# Patient Record
Sex: FEMALE | Race: WHITE | NOT HISPANIC OR LATINO
[De-identification: names, ages, dates, MRNs, and addresses within clinical notes are randomized per-mention and may not be internally consistent; named-entity substitution may affect disease eponyms.]

---

## 2022-08-29 ENCOUNTER — APPOINTMENT (OUTPATIENT)
Dept: PSYCHIATRY | Facility: CLINIC | Age: 27
End: 2022-08-29

## 2022-08-29 DIAGNOSIS — Z85.71 PERSONAL HISTORY OF HODGKIN LYMPHOMA: ICD-10-CM

## 2022-08-29 DIAGNOSIS — Z87.09 PERSONAL HISTORY OF OTHER DISEASES OF THE RESPIRATORY SYSTEM: ICD-10-CM

## 2022-08-29 DIAGNOSIS — Z87.2 PERSONAL HISTORY OF DISEASES OF THE SKIN AND SUBCUTANEOUS TISSUE: ICD-10-CM

## 2022-08-29 PROBLEM — Z00.00 ENCOUNTER FOR PREVENTIVE HEALTH EXAMINATION: Status: ACTIVE | Noted: 2022-08-29

## 2022-08-29 PROCEDURE — 99205 OFFICE O/P NEW HI 60 MIN: CPT | Mod: 95

## 2022-09-15 ENCOUNTER — APPOINTMENT (OUTPATIENT)
Dept: PSYCHIATRY | Facility: CLINIC | Age: 27
End: 2022-09-15

## 2022-09-16 ENCOUNTER — APPOINTMENT (OUTPATIENT)
Dept: PSYCHIATRY | Facility: CLINIC | Age: 27
End: 2022-09-16

## 2022-09-16 DIAGNOSIS — F41.9 ANXIETY DISORDER, UNSPECIFIED: ICD-10-CM

## 2022-09-16 PROCEDURE — 99215 OFFICE O/P EST HI 40 MIN: CPT | Mod: 95

## 2022-09-16 RX ORDER — QUETIAPINE 400 MG/1
400 TABLET, FILM COATED, EXTENDED RELEASE ORAL
Qty: 60 | Refills: 0 | Status: ACTIVE | COMMUNITY
Start: 1900-01-01 | End: 1900-01-01

## 2022-09-19 ENCOUNTER — NON-APPOINTMENT (OUTPATIENT)
Age: 27
End: 2022-09-19

## 2022-09-22 ENCOUNTER — APPOINTMENT (OUTPATIENT)
Dept: PSYCHIATRY | Facility: CLINIC | Age: 27
End: 2022-09-22

## 2022-09-29 ENCOUNTER — NON-APPOINTMENT (OUTPATIENT)
Age: 27
End: 2022-09-29

## 2022-09-29 ENCOUNTER — APPOINTMENT (OUTPATIENT)
Dept: PSYCHIATRY | Facility: CLINIC | Age: 27
End: 2022-09-29

## 2022-09-29 ENCOUNTER — TRANSCRIPTION ENCOUNTER (OUTPATIENT)
Age: 27
End: 2022-09-29

## 2022-09-29 VITALS
SYSTOLIC BLOOD PRESSURE: 119 MMHG | HEART RATE: 134 BPM | RESPIRATION RATE: 16 BRPM | BODY MASS INDEX: 24.2 KG/M2 | WEIGHT: 140 LBS | HEIGHT: 63.75 IN | OXYGEN SATURATION: 97 % | DIASTOLIC BLOOD PRESSURE: 82 MMHG | TEMPERATURE: 97.7 F

## 2022-09-30 ENCOUNTER — APPOINTMENT (OUTPATIENT)
Dept: PSYCHIATRY | Facility: CLINIC | Age: 27
End: 2022-09-30

## 2022-09-30 PROCEDURE — 99214 OFFICE O/P EST MOD 30 MIN: CPT | Mod: 95

## 2022-09-30 RX ORDER — BUSPIRONE HYDROCHLORIDE 5 MG/1
5 TABLET ORAL EVERY 8 HOURS
Qty: 90 | Refills: 0 | Status: ACTIVE | COMMUNITY
Start: 1900-01-01 | End: 1900-01-01

## 2022-10-06 ENCOUNTER — NON-APPOINTMENT (OUTPATIENT)
Age: 27
End: 2022-10-06

## 2022-10-13 ENCOUNTER — APPOINTMENT (OUTPATIENT)
Dept: PSYCHIATRY | Facility: CLINIC | Age: 27
End: 2022-10-13

## 2022-10-13 ENCOUNTER — NON-APPOINTMENT (OUTPATIENT)
Age: 27
End: 2022-10-13

## 2022-10-13 PROCEDURE — 99214 OFFICE O/P EST MOD 30 MIN: CPT | Mod: 95

## 2022-10-13 NOTE — DISCUSSION/SUMMARY
[FreeTextEntry1] : Isabella Hensley 26yo white cis heterosexual Buddhist female,  and recently moved to [place] with her . She has a history of depression, social anxiety, disordered eating, dissociative symptoms, and psychotic experiences. She has one previous inpatient hospitalization in [place] from June-July 2021 precipitated by CAH becoming more intense and telling her to kill herself. She describes the voices as “other people’s thoughts about her”. In the past year she has been in the Step program (FEP) at [employer]. She is currently stable on Seroquel ER 400mg PO BID, Lamictal 200mg PO Qam, buspirone 5mg PO TID PRN anxiety. Pt notes a history of childhood social and generalized anxiety and disordered eating in adolescence – excessive exercise and food restriction. During her senior year of HS she experienced a MDE marked by depressed mood, excessive sleep, “dark thoughts”, difficulty with concentration resulting in academic decline, and anhedonia. She first sought treatment her Freshman year of College in the Fall of 2015 at [facility] where she was prescribed sertraline from 4282-0406. She discontinued in 2017. In 2015 she first started hearing transient voices and dissociative phenomenon but was able to reality test. In 2017 she dropped out of mental health treatment while undergoing chemotherapy for Hodgkin’s Lymphoma. She resumed tx in Spring 2019 at which time she experienced more “out of body” dissociative symptoms. She was in treatment with a therapist, Dr. Youssef then started a DBT IOP in summer of 2019 where she was seen by a psych NP for 3 months and started on a low dose of abilify. In Fall of 2019 she started treatment with psychiatrist, Dr. Crowell and therapist Adolfo Randolph (Fall 2019-Sring 2021). Dr. Crowell prescribed abilify, busbar, gabapentin until Summer of 2020. She was not on meds btw summer 2020 and 2021 at which time she was hospitalized in [place] and started on Seroquel after trial s of Haldol and high dose abilify. She was discharged on Seroquel IR 400mg which was changed to 400mg ER BID in Feb 2022. In Fall of 2021 lamictal 200mg PO Qam was added due to rapid cycling mood. She is also prescribed propranolol 5mg PO TID PRN anxiety. Patient started to meet threshold for psychosis in Summer 2020 around the time she graduated from college. 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 She also started experiencing thought broadcasting, thought control, ideas of reference. She reports some medication side effects including: Dizziness, constipation, weight gain, tachycarida. Patient denies recent SI but reports SI in the past with some preparatory behavior – see above for details. History of self injurious behavior in 2015 including hitting head with phone, scratching self and stabbing self with pen. Trauma: no trauma hx. Substance: infrequent social drinking. Fam Hx: Father – anxiety, paranoia; brother- depression, ADHD, Sister: panic attacks. Social Hx: Grew up in [facility] [place] [facility] [place], twin sister, 23yo brther, 16yo sister. Father is a  at [employer] and mother was teacher and stay at home parent. Patient studied psychology at [employer] and is currently working toward [employer] certification. Medical Hx: Hodgin’s Lymphoma summer 2017, in remission; asthma, eczema. Allergies: NKDA. \par \par 30 minutes spent: reviewed chart,  developed rapport, provided psycheducation, reviewed meds - risks vs benefits and potential side effects. Reviewed labs. Discussed lifestyle modifications to address elevated lipid profile. F/U w/ PCP. Shared decision to continue adjusting dose of seroquel to eventually be taken at bedtime.\par \par

## 2022-10-13 NOTE — PAST MEDICAL HISTORY
[FreeTextEntry1] : PAST OUTPATIENT TREATMENT (PSYCHOTHERAPY, PSYCHOPHARMACOLOGY, PSYCHOLOGICAL TESTING):\par [facility] 5428-1114 (freshman year) for therapy and medication management; DBT IOP Summer 2019; DBT outpatient therapist Adolfo Randolph fall 2019- spring 2021; psychiatrist Dr. Crowell for medication mgmt fall 2019\par PAST PSYCHIATRIC MEDICATIONS (NAMES, DOSES, DATES TAKEN, EFFECTIVENESS):\par Sertraline from 3945-3935\par Abilify, Busbar, Gabapentin until Summer of 2020\par Seroquel ER 400mg PO BID, Lamictal 200mg PO Qam, Propranolol 5mg PO TID PRN anxiety (current medications)\par PAST HOSPITALIZATIONS (DATES, REASON FOR ADMISSION, LENGTH OF STAY, TREATMENT, LAST PSYCHIATRIC TREATMENT):\par [facility] voluntary admission due to paranoid ideation, CAH to harm self June-July 2021 for 2.5 weeks\par DATE OF LAST PHYSICAL EXAMINATION:\par Unknown\par

## 2022-10-13 NOTE — PHYSICAL EXAM
[Average] : average [Cooperative] : cooperative [Euthymic] : euthymic [Clear] : clear [WNL] : within normal limits [de-identified] : anxious

## 2022-10-13 NOTE — HISTORY OF PRESENT ILLNESS
[FreeTextEntry1] : Pt is seen and evaluated. Interval history reviewed. Reviewed labs, VS, meds, last visit with former psychiatrist. Hgb A1c was 4.7. Pt met with RN for core visit and routine monitoring. Pt wishes to continue adjusting dose of seroquel to take more at bedtime. She is now taking 300mg PO Qam and 500mg PO QHS due to daytime sedation. LDL and T chol was elevated at last visit with psychiatrist.PT scheduled to meet with new PCP on 11/7 - Dr. Emily Leal - [phone]. Pt denies any change from baseline anxiety, mood. She denies SI/HI or AH.

## 2022-10-13 NOTE — RISK ASSESSMENT
[No, patient denies ideation or behavior] : No, patient denies ideation or behavior [No] : No [FreeTextEntry9] : Low acute risk. Elevated chronic risk given diagnosis, past suicidality with preparatory behavior. Protective factors include supportive family, future orientation, level of education.

## 2022-10-13 NOTE — SOCIAL HISTORY
[FreeTextEntry1] : RACE/ETHNICITY:   \par White\par GENDER IDENTITY:     \par Female\par SEXUAL IDENTITY:\par Heterosexual\par MARITAL STATUS:  \par \par PREFERRED LANGUAGE:    \par English\par OTHER LANGUAGES SPOKEN:\par Turkish\par Religion AFFILIATION:\par [employer]\par PLACE OF BIRTH:\par [place], [place]\par DEVELOPMENTAL HISTORY (DELAYED MILESTONES: SPEECH, MOTOR, FINE MOTOR, SOCIAL; HISTORY OF IN UTERO EXPOSURE, BIRTH HISTORY, SIBLING RIVALRY)\par Unremarkable developmental history noted/reported\par SCHOOL TYPE/GRADE:\par [[X ]] PUBLIC\par [[ ]] PRIVATE\par [[ ]] CHARTER\par [[ ]] OTHER:  [ ]\par GRADE:  completed bachelor degree at [employer] \par PROBLEMS IN SCHOOL (LEARNING AND BEHAVIORAL PROBLEMS, HISTORY OF IEP/504):\par None reported/noted\par SIGNIFICANT MEMBERS OF HOUSEHOLD (CHILDREN, PARENTS, SIBLINGS):\par Grew up with parents and younger brother (23yo) and twin sister; currently living with \par PAST/CURRENT RELATIONSHIP WITH FAMILY: \par Reports being "close" with family/parents\par PAST/CURRENT RELATIONSHIPS WITH SIGNIFICANT OTHERS: \par "Good" relationship w//others\par SIGNIFICANT LOSSES (DIVORCE, NEGLECT, ETC.):\par None reported\par WORK HISTORY (PAST AND CURRENT EMPLOYMENT):\par 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

## 2022-10-13 NOTE — REASON FOR VISIT
[FreeTextEntry1] : Psychiatric Follow Up [Home] : at home, [unfilled] , at the time of the visit. [Other Location: e.g. Home (Enter Location, City,State)___] : at [unfilled] [Spouse] : spouse [Mother] : mother [Patient] : the patient

## 2022-10-13 NOTE — PLAN
[Yes. details: ___] : Yes, [unfilled] [Medication education provided] : Medication education provided. [Rationale for medication choices, possible risks/precautions, benefits, alternative treatment choices, and consequences of non-treatment discussed] : Rationale for medication choices, possible risks/precautions, benefits, alternative treatment choices, and consequences of non-treatment discussed with patient/family/caregiver  [Order(s) for labs placed in [place] EHR] : Labs [FreeTextEntry5] : PLAN: #) decrease seroquel ER 300mg PO Qam and 500mg PO QHS to 250mg PO Qam and 550mg PO QHS for 3 days then to 200mg PO Qam and 600mg PO QHS #) continue lamictal 200mg PO QD #) continue buspirone 5mg Po TID #) f/u with PCP re: tachycardia #) RN visits Q 3 months for VS, AIMS etc \par

## 2022-10-20 ENCOUNTER — NON-APPOINTMENT (OUTPATIENT)
Age: 27
End: 2022-10-20

## 2022-10-27 ENCOUNTER — NON-APPOINTMENT (OUTPATIENT)
Age: 27
End: 2022-10-27

## 2022-10-27 ENCOUNTER — APPOINTMENT (OUTPATIENT)
Dept: PSYCHIATRY | Facility: CLINIC | Age: 27
End: 2022-10-27

## 2022-10-27 PROCEDURE — 99214 OFFICE O/P EST MOD 30 MIN: CPT | Mod: 95

## 2022-10-27 NOTE — DISCUSSION/SUMMARY
[FreeTextEntry1] : Isabella Hensley 28yo white cis heterosexual Congregational female,  and recently moved to [place] with her . She has a history of depression, social anxiety, disordered eating, dissociative symptoms, and psychotic experiences. She has one previous inpatient hospitalization in [place] from June-July 2021 precipitated by CAH becoming more intense and telling her to kill herself. She describes the voices as “other people’s thoughts about her”. In the past year she has been in the Step program (FEP) at [employer]. She is currently stable on Seroquel ER 400mg PO BID, Lamictal 200mg PO Qam, buspirone 5mg PO TID PRN anxiety. Pt notes a history of childhood social and generalized anxiety and disordered eating in adolescence – excessive exercise and food restriction. During her senior year of HS she experienced a MDE marked by depressed mood, excessive sleep, “dark thoughts”, difficulty with concentration resulting in academic decline, and anhedonia. She first sought treatment her Freshman year of College in the Fall of 2015 at [facility] where she was prescribed sertraline from 2936-0861. She discontinued in 2017. In 2015 she first started hearing transient voices and dissociative phenomenon but was able to reality test. In 2017 she dropped out of mental health treatment while undergoing chemotherapy for Hodgkin’s Lymphoma. She resumed tx in Spring 2019 at which time she experienced more “out of body” dissociative symptoms. She was in treatment with a therapist, Dr. Youssef then started a DBT IOP in summer of 2019 where she was seen by a psych NP for 3 months and started on a low dose of abilify. In Fall of 2019 she started treatment with psychiatrist, Dr. Crowell and therapist Adolfo Randolph (Fall 2019-Sring 2021). Dr. Crowell prescribed abilify, busbar, gabapentin until Summer of 2020. She was not on meds btw summer 2020 and 2021 at which time she was hospitalized in [place] and started on Seroquel after trial s of Haldol and high dose abilify. She was discharged on Seroquel IR 400mg which was changed to 400mg ER BID in Feb 2022. In Fall of 2021 lamictal 200mg PO Qam was added due to rapid cycling mood. She is also prescribed propranolol 5mg PO TID PRN anxiety. Patient started to meet threshold for psychosis in Summer 2020 around the time she graduated from college. 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 She also started experiencing thought broadcasting, thought control, ideas of reference. She reports some medication side effects including: Dizziness, constipation, weight gain, tachycarida. Patient denies recent SI but reports SI in the past with some preparatory behavior – see above for details. History of self injurious behavior in 2015 including hitting head with phone, scratching self and stabbing self with pen. Trauma: no trauma hx. Substance: infrequent social drinking. Fam Hx: Father – anxiety, paranoia; brother- depression, ADHD, Sister: panic attacks. Social Hx: Grew up in [place] [facility], twin sister, 25yo brther, 18yo sister. Father is a  at [employer] and mother was teacher and stay at home parent. Patient studied psychology at [employer] and is currently working toward [employer] certification. Medical Hx: Hodgin’s Lymphoma summer 2017, in remission; asthma, eczema. Allergies: NKDA. \par \par 30 minutes spent: reviewed chart,  developed rapport, provided psycheducation, reviewed meds - risks vs benefits and potential side effects. Reviewed labs. Discussed lifestyle modifications to address elevated lipid profile. F/U w/ PCP. Shared decision to continue adjusting dose of seroquel to eventually be taken at bedtime.\par \par

## 2022-10-27 NOTE — PHYSICAL EXAM
[Average] : average [Cooperative] : cooperative [Euthymic] : euthymic [Clear] : clear [WNL] : within normal limits [de-identified] : anxious

## 2022-10-27 NOTE — SOCIAL HISTORY
[FreeTextEntry1] : RACE/ETHNICITY:   \par White\par GENDER IDENTITY:     \par Female\par SEXUAL IDENTITY:\par Heterosexual\par MARITAL STATUS:  \par \par PREFERRED LANGUAGE:    \par English\par OTHER LANGUAGES SPOKEN:\par Amharic\par Hinduism AFFILIATION:\par [employer]oodoo\par PLACE OF BIRTH:\par [place], [place]\par DEVELOPMENTAL HISTORY (DELAYED MILESTONES: SPEECH, MOTOR, FINE MOTOR, SOCIAL; HISTORY OF IN UTERO EXPOSURE, BIRTH HISTORY, SIBLING RIVALRY)\par Unremarkable developmental history noted/reported\par SCHOOL TYPE/GRADE:\par [[X ]] PUBLIC\par [[ ]] PRIVATE\par [[ ]] CHARTER\par [[ ]] OTHER:  [ ]\par GRADE:  completed bachelor degree at [employer] \par PROBLEMS IN SCHOOL (LEARNING AND BEHAVIORAL PROBLEMS, HISTORY OF IEP/504):\par None reported/noted\par SIGNIFICANT MEMBERS OF HOUSEHOLD (CHILDREN, PARENTS, SIBLINGS):\par Grew up with parents and younger brother (25yo) and twin sister; currently living with \par PAST/CURRENT RELATIONSHIP WITH FAMILY: \par Reports being "close" with family/parents\par PAST/CURRENT RELATIONSHIPS WITH SIGNIFICANT OTHERS: \par "Good" relationship w//others\par SIGNIFICANT LOSSES (DIVORCE, NEGLECT, ETC.):\par None reported\par WORK HISTORY (PAST AND CURRENT EMPLOYMENT):\par 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

## 2022-10-27 NOTE — PAST MEDICAL HISTORY
[FreeTextEntry1] : PAST OUTPATIENT TREATMENT (PSYCHOTHERAPY, PSYCHOPHARMACOLOGY, PSYCHOLOGICAL TESTING):\par [facility] [id number] (freshman year) for therapy and medication management; DBT IOP Summer 2019; DBT outpatient therapist Adolfo Randolph fall 2019- spring 2021; psychiatrist Dr. Crowell for medication mgmt fall 2019\par PAST PSYCHIATRIC MEDICATIONS (NAMES, DOSES, DATES TAKEN, EFFECTIVENESS):\par Sertraline from 6536-9199\par Abilify, Busbar, Gabapentin until Summer of 2020\par Seroquel ER 400mg PO BID, Lamictal 200mg PO Qam, Propranolol 5mg PO TID PRN anxiety (current medications)\par PAST HOSPITALIZATIONS (DATES, REASON FOR ADMISSION, LENGTH OF STAY, TREATMENT, LAST PSYCHIATRIC TREATMENT):\par [facility] voluntary admission due to paranoid ideation, CAH to harm self June-July 2021 for 2.5 weeks\par DATE OF LAST PHYSICAL EXAMINATION:\par Unknown\par

## 2022-10-27 NOTE — HISTORY OF PRESENT ILLNESS
[FreeTextEntry1] : Pt is seen and evaluated. Interval history reviewed. Pt reports good tolerance and response since last adjustment to seroquel dosing - Pt wishes to continue adjusting dose of seroquel to take more at bedtime. She is now taking 200mg PO Qam and 600mg PO QHS due to daytime sedation. LDL and T chol was elevated at last visit with psychiatrist. PT scheduled to meet with new PCP on 11/7 - Dr. Emily Leal - [phone]. Pt denies any change from baseline anxiety, mood. She denies SI/HI or AH.

## 2022-10-27 NOTE — FAMILY HISTORY
[FreeTextEntry1] : Twin sister with hx of panic attacks\par Younger brother with hx of ADHD and depression\par Father w/anxiety\par Maternal first cousin w/schizoaffective d/o

## 2022-10-27 NOTE — PLAN
[Yes. details: ___] : Yes, [unfilled] [Medication education provided] : Medication education provided. [Rationale for medication choices, possible risks/precautions, benefits, alternative treatment choices, and consequences of non-treatment discussed] : Rationale for medication choices, possible risks/precautions, benefits, alternative treatment choices, and consequences of non-treatment discussed with patient/family/caregiver  [Order(s) for labs placed in [place] EHR] : Labs [FreeTextEntry5] : PLAN: #) change seroquel ER 200mg PO Qam and 600mg PO QHS to 100mg PO Qam and 700mg PO QHS #) continue lamictal 200mg PO QD #) continue buspirone 5mg Po TID #) f/u with PCP re: tachycardia -visit scheduled on 11/17 #) RN visits Q 3 months for VS, AIMS etc \par

## 2022-10-27 NOTE — PHYSICAL EXAM
[FreeTextEntry1] : Pt arrived on time to community visit, euthymic mood and congruent affect, thought process linear and content appropriate to discussion.

## 2022-10-27 NOTE — REASON FOR VISIT
[FreeTextEntry1] : Psychiatric Follow Up [Home] : at home, [unfilled] , at the time of the visit. [Other Location: e.g. Home (Enter Location, City,State)___] : at [unfilled] [Patient] : the patient

## 2022-11-03 ENCOUNTER — NON-APPOINTMENT (OUTPATIENT)
Age: 27
End: 2022-11-03

## 2022-11-07 NOTE — PLAN
[Return in ____ week(s)] : Return in [unfilled] week(s) [FreeTextEntry2] : Begin to "feel comfortable discussing issues in [her] life" with therapist. \par Decrease anxiety regarding social situations and interactions with others.  [de-identified] : Discussed structured therapy as both helpful as well as not helpful to addressing anxiety. Discussed the need to move slowly with a new therapist in terms of discussing different issues due to the importance of establishing trust. Discussed related issues regarding feeling in control and how different feelings about control can affect thoughts and behaviors.

## 2022-11-07 NOTE — BEHAVIORAL HEALTH
[Prevent psychological harm to patient or another individual] : Prevent psychological harm to patient or another individual [FreeTextEntry1] : Pt is in the process of connecting with a new therapist and has demonstrated significant anxiety in beginning a new treatment that may be resonably exacerbated by reading certain elements of behavioral health documentation, due to her diagnosis of schizoaffective disorder and history of substantial anxiety and trust difficulties.  [FreeTextEntry2] : Pt and  arrived on time to community session ([place]). Euthymic mood and subdued affect, dressed for the weather, thought process and content linear and goal directed, appropriate to discussion. Pt has difficulties with eye contact, pt reports this is due to anxiety about beginning treatment with a new therapist

## 2022-11-07 NOTE — PLAN
[Return in ____ week(s)] : Return in [unfilled] week(s) [FreeTextEntry2] : Begin to "feel comfortable discussing issues in [her] life" with therapist. \par Decrease anxiety regarding social situations and interactions with others.  [de-identified] : Discussed history of past treatment in [place]. Discussed difficult transitioning to a new therapist. Discussed difficulties talking about problems/issues in her life. Discussed the importance of establishing trust in order to feel comfortable. Discussed meeting once weekly with  present for community visits in [place].

## 2022-11-07 NOTE — PLAN
[Return in ____ week(s)] : Return in [unfilled] week(s) [FreeTextEntry2] : Begin to "feel comfortable discussing issues in [her] life" with therapist. \par Decrease anxiety regarding social situations and interactions with others.  [de-identified] : Discussed structured therapy as both helpful as well as not helpful to addressing anxiety. Discussed the need to move slowly with a new therapist in terms of discussing different issues due to the importance of establishing trust. Discussed related issues regarding feeling in control and how different feelings about control can affect thoughts and behaviors.

## 2022-11-07 NOTE — BEHAVIORAL HEALTH
[Prevent psychological harm to patient or another individual] : Prevent psychological harm to patient or another individual [FreeTextEntry1] : Pt is in the process of connecting with a new therapist and has demonstrated significant anxiety in beginning a new treatment that may be resonably exacerbated by reading certain elements of behavioral health documentation, due to her diagnosis of schizoaffective disorder and history of substantial anxiety and trust difficulties.  [FreeTextEntry2] : Pt and  arrived on time to community session ([place]). Euthymic mood and subdued affect, dressed for the weather, thought process and content linear and goal directed, appropriate to discussion. Pt has difficulties with eye contact, pt reports this is due to anxiety about beginning treatment with a new therapist

## 2022-11-07 NOTE — BEHAVIORAL HEALTH
[Prevent psychological harm to patient or another individual] : Prevent psychological harm to patient or another individual [FreeTextEntry1] : Pt is in the process of connecting with a new therapist and has demonstrated significant anxiety in beginning a new treatment that may be resonably exacerbated by reading certain elements of behavioral health documentation, due to her diagnosis of schizoaffective disorder and history of substantial anxiety and trust difficulties.  [FreeTextEntry2] : Pt and  arrived on time to community session ([place]). Euthymic mood and subdued affect, dressed for the weather, thought process and content linear and goal directed, appropriate to discussion. Pt has difficulties with eye contact, pt reports this is due to anxiety about beginning treatment with a new therapist

## 2022-11-10 ENCOUNTER — NON-APPOINTMENT (OUTPATIENT)
Age: 27
End: 2022-11-10

## 2022-11-11 ENCOUNTER — APPOINTMENT (OUTPATIENT)
Dept: PSYCHIATRY | Facility: CLINIC | Age: 27
End: 2022-11-11

## 2022-11-15 ENCOUNTER — APPOINTMENT (OUTPATIENT)
Dept: PSYCHIATRY | Facility: CLINIC | Age: 27
End: 2022-11-15

## 2022-11-17 ENCOUNTER — NON-APPOINTMENT (OUTPATIENT)
Age: 27
End: 2022-11-17

## 2022-11-22 ENCOUNTER — APPOINTMENT (OUTPATIENT)
Dept: PSYCHIATRY | Facility: CLINIC | Age: 27
End: 2022-11-22

## 2022-11-22 NOTE — DISCUSSION/SUMMARY
[Once a week] : once a week [Other: ____] : [unfilled] [de-identified] : Social Skills group [FreeTextEntry8] : Focus on listening skills, role plays and listening activities [FreeTextEntry4] : Good participation

## 2022-11-23 NOTE — BEHAVIORAL HEALTH
[Prevent psychological harm to patient or another individual] : Prevent psychological harm to patient or another individual [FreeTextEntry1] : Pt is in the process of connecting with a new therapist and has demonstrated significant anxiety in beginning a new treatment that may be reasonably exacerbated by reading certain elements of behavioral health documentation, due to her diagnosis of schizoaffective disorder and history of substantial anxiety and trust difficulties.  [FreeTextEntry2] : Pt and  arrived on time to community session ([place]). Euthymic mood and congruent affect, dressed for the weather, thought process and content linear and goal directed, appropriate to discussion. Pt was more open and talkative this session than previous sessions. Pt continues to have difficulties with eye contact, pt reports this is due to anxiety about beginning treatment with a new therapist.

## 2022-11-23 NOTE — ADDENDUM
[FreeTextEntry1] : . . Case discussed in clinical supervision with Dr. Sena Harper [[X]] individual [[ ]] group (Check one) ASSESSMENT METHOD (Check all that apply):  [[ ]] Case presentation  [[ ]] Review of Record/Data  [[ ]] Direct Observation  [[ ]] Audio Recording  [[ ]] Video Recording  FOCUS OF SUPERVISION (Check all that apply):  [[ ]] Diagnosis & Assessment  [[X ]] Intervention  [[ ]] Professional Conduct  [[ ]] Culture and Diversity  [[ ]] Scholarly Inquiry    [[ ]] Consultation  [[ ]] Supervision  [[ ]] Administration/Documentation

## 2022-11-23 NOTE — PLAN
[Return in ____ week(s)] : Return in [unfilled] week(s) [de-identified] : Discussed feelings about psychiatric medications. Discussed the importance of being able to talk openly with doctors. Discussed how on the one hand it can be important to talk about issues in therapy, and also that this can feel like too much at times.  [FreeTextEntry2] : Begin to "feel comfortable discussing issues in [her] life" with therapist. \par Decrease anxiety regarding social situations and interactions with others.

## 2022-11-23 NOTE — PLAN
[Return in ____ week(s)] : Return in [unfilled] week(s) [de-identified] : Discussed educational history and difficult feelings about educational history. Discussed medical and psychiatric difficulties impacting college education several years ago. Discussed feelings about wanting and needing to be believed when one feels something important.  [FreeTextEntry2] : Begin to "feel comfortable discussing issues in [her] life" with therapist. \par Decrease anxiety regarding social situations and interactions with others.

## 2022-11-25 NOTE — BEHAVIORAL HEALTH
[Prevent psychological harm to patient or another individual] : Prevent psychological harm to patient or another individual [FreeTextEntry1] : Pt is in the process of connecting with a new therapist and has demonstrated significant anxiety in beginning a new treatment that may be reasonably exacerbated by reading certain elements of behavioral health documentation, due to her diagnosis of schizoaffective disorder and history of substantial anxiety and trust difficulties.  [FreeTextEntry2] : Pt and  arrived on time to community session ([place]). Euthymic mood and congruent affect, dressed for the weather, thought process and content linear and goal directed, appropriate to discussion. Pt was appropriately tearful at times expressing difficult emotions. Pt continues to be able to make sustained eye contact with the therapist.

## 2022-11-29 ENCOUNTER — APPOINTMENT (OUTPATIENT)
Dept: PSYCHIATRY | Facility: CLINIC | Age: 27
End: 2022-11-29

## 2022-11-29 NOTE — DISCUSSION/SUMMARY
[Once a week] : once a week [Other: ____] : [unfilled] [de-identified] : Social Skills group [FreeTextEntry8] : Review of listening skills, focus on asking different types of questions, use of videos, role plays and listening activities.  [FreeTextEntry4] : Good participation

## 2022-11-30 ENCOUNTER — APPOINTMENT (OUTPATIENT)
Dept: PSYCHIATRY | Facility: CLINIC | Age: 27
End: 2022-11-30

## 2022-11-30 PROCEDURE — 90853 GROUP PSYCHOTHERAPY: CPT | Mod: 95

## 2022-11-30 NOTE — PLAN
[Return in ____ week(s)] : Return in [unfilled] week(s) [FreeTextEntry2] : Begin to "feel comfortable discussing issues in [her] life" with therapist. \par Decrease anxiety regarding social situations and interactions with others.  [de-identified] : Discussed aspects of history including hobbies and interests. Discussed recent trip to visit a friend, and feelings that came up around taking this trip. Discussed the importance of feeling safe in relationships.

## 2022-11-30 NOTE — BEHAVIORAL HEALTH
[Prevent psychological harm to patient or another individual] : Prevent psychological harm to patient or another individual [FreeTextEntry1] : Pt is in the process of connecting with a new therapist and has demonstrated significant anxiety in beginning a new treatment that may be reasonably exacerbated by reading certain elements of behavioral health documentation, due to her diagnosis of schizoaffective disorder and history of substantial anxiety and trust difficulties.  [FreeTextEntry2] : Pt and  arrived on time to community session ([place]). Euthymic mood and subdued affect, dressed for the weather, thought process and content linear and goal directed, appropriate to discussion. Pt continues to have difficulties with eye contact, pt reports this is due to anxiety about beginning treatment with a new therapist.

## 2022-11-30 NOTE — PLAN
[Return in ____ week(s)] : Return in [unfilled] week(s) [FreeTextEntry2] : Begin to "feel comfortable discussing issues in [her] life" with therapist. \par Decrease anxiety regarding social situations and interactions with others.  [de-identified] : Continued to discuss psychiatric difficulties, feelings about medications, and challenges in communicating with others. Patient responded positively to supportive and clarifying interventions and contiuned to discuss her experiences.

## 2022-11-30 NOTE — PLAN
[Return in ____ week(s)] : Return in [unfilled] week(s) [FreeTextEntry2] : Begin to "feel comfortable discussing issues in [her] life" with therapist. \par Decrease anxiety regarding social situations and interactions with others.  [de-identified] : Patient reports wanting to sit down and talk with therapist face to face rather than walking for the whole session. Discussed the desire to and simultaneous difficulties with talking about issues in therapy. Discussed upcoming trips, and discussed difficulties in important social situations.

## 2022-11-30 NOTE — BEHAVIORAL HEALTH
[Prevent psychological harm to patient or another individual] : Prevent psychological harm to patient or another individual [FreeTextEntry1] : Pt is in the process of connecting with a new therapist and has demonstrated significant anxiety in beginning a new treatment that may be reasonably exacerbated by reading certain elements of behavioral health documentation, due to her diagnosis of schizoaffective disorder and history of substantial anxiety and trust difficulties.  [FreeTextEntry2] : Pt and  arrived on time to community session ([place]). Euthymic mood and congruent affect, dressed for the weather, thought process and content linear and goal directed, appropriate to discussion. Pt was appropriately tearful at times expressing difficult emotions. Pt continues to be able to make sustained eye contact with the therapist.

## 2022-11-30 NOTE — BEHAVIORAL HEALTH
[Prevent psychological harm to patient or another individual] : Prevent psychological harm to patient or another individual [FreeTextEntry1] : Pt is in the process of connecting with a new therapist and has demonstrated significant anxiety in beginning a new treatment that may be reasonably exacerbated by reading certain elements of behavioral health documentation, due to her diagnosis of schizoaffective disorder and history of substantial anxiety and trust difficulties.  [FreeTextEntry2] : Pt and  arrived on time to community session ([place]). Euthymic mood and congruent affect, dressed for the weather, thought process and content linear and goal directed, appropriate to discussion. Pt was able to make sustained eye contact with the therapist, which is a notable improvement from previous sessions.

## 2022-12-01 ENCOUNTER — NON-APPOINTMENT (OUTPATIENT)
Age: 27
End: 2022-12-01

## 2022-12-01 ENCOUNTER — APPOINTMENT (OUTPATIENT)
Dept: PSYCHIATRY | Facility: CLINIC | Age: 27
End: 2022-12-01

## 2022-12-01 NOTE — BEHAVIORAL HEALTH
[Prevent psychological harm to patient or another individual] : Prevent psychological harm to patient or another individual [FreeTextEntry1] : Pt is in the process of connecting with a new therapist and has demonstrated significant anxiety in beginning a new treatment that may be reasonably exacerbated by reading certain elements of behavioral health documentation, due to her diagnosis of schizoaffective disorder and history of substantial anxiety and trust difficulties.  [FreeTextEntry2] : Pt and  arrived on time to community session ([place]). Euthymic mood and congruent affect, dressed for the weather, thought process and content linear and goal directed, appropriate to discussion. Pt continues to be able to make sustained eye contact with the therapist. Pt described past experiences with positive psychotic symptoms. No current positive psychotic symptoms reported.

## 2022-12-01 NOTE — DISCUSSION/SUMMARY
[Multi-Family (minimum 60 min)] : Multi-Family [Every ___ weeks] : Every [unfilled] weeks [45 - 60 minutes] : 45 - 60 minutes [de-identified] : [facility] Family Group met via zoom – audio and video \par Group members consented to treatment and privacy rules\par  [FreeTextEntry8] : Family Group continued the discussion about stigma related to mental illness and particular psychosis. Family group members were provided with some basic information (research data and concepts) and engaged in discussion. Points related to recent local news re mental illness and homelessness/aggression, issue of disclosure in relationships, possibility of recovery, etc. were touched upon.  [FreeTextEntry4] : .\par .\par BEHAVIOR IN GROUP\par [[x ]] Showed insight \par [[x ]] Showed interest\par [[ ]] Showed leadership\par [[x ]] Active in discussion\par [[x ]] Offered constructive input\par [[ ]] Supportive to others\par [[ ]] Not supportive to others\par [[ ]] No apparent interest\par [[ ]] Quietly engaged\par [[ ]] Withdrawn\par [[ ]] Appeared distracted\par [[ ]] Disruptive\par INDIVIDUAL’S MOOD:\par [[ x]] Stable\par [[ ]] Depressed/Sad\par [[ ]] ANGRY\par [[ ]] Anxious\par [[ ]] Other  [ ]\par NEW ISSUES / STRESSORS / EXTRAORDINARY EVENTS PRESENTED TODAY:\par [[ ]] New Issue Resolved, No Update Required\par [[ ]] New Issue, Treatment Plan  Update Required\par [[x ]] None Reported \par Details:  [ ]\par \par Pt’s  joined the meeting for the first time. He shared his experiences in the context of his wife’s prodrome phase as well as FEP. He shared his perspectives on was in which he observed parts of treatment being helpful, expressed hope and commitment to provide support needed.

## 2022-12-01 NOTE — PLAN
[Return in ____ week(s)] : Return in [unfilled] week(s) [FreeTextEntry2] : Begin to "feel comfortable discussing issues in [her] life" with therapist. \par Decrease anxiety regarding social situations and interactions with others.  [de-identified] : Discussed recent trip to [place] and different experiences of anxiety on the trip. Discussed desire to have the session without  present. Discussed past anxious experiences. Provided supportive and clarifying interventions aimed at elaborating thoughts and feelings. Patient responded positively to interventions and continued to discuss past situations impacting her life.

## 2022-12-05 ENCOUNTER — APPOINTMENT (OUTPATIENT)
Dept: PSYCHIATRY | Facility: CLINIC | Age: 27
End: 2022-12-05

## 2022-12-05 PROCEDURE — 99214 OFFICE O/P EST MOD 30 MIN: CPT | Mod: 95

## 2022-12-05 RX ORDER — LAMOTRIGINE 200 MG/1
200 TABLET ORAL
Qty: 30 | Refills: 0 | Status: ACTIVE | COMMUNITY
Start: 1900-01-01 | End: 1900-01-01

## 2022-12-05 NOTE — PHYSICAL EXAM
[Average] : average [Cooperative] : cooperative [Euthymic] : euthymic [Clear] : clear [WNL] : within normal limits [de-identified] : anxious

## 2022-12-05 NOTE — HISTORY OF PRESENT ILLNESS
[FreeTextEntry1] : Pt is seen and evaluated. Interval history reviewed. Pt switched to fully consolidated dosing of seroquel xr 800mg on 11/18. She started experiencing difficulty in the evening: "Wearing off at night – feel more fragile, fragmented, hard to stay in the present and not end up in different world of things , paranoia". She decided to switch to morning dosing of seroquel xr 800mg on 11/22. Despite a little morning grogginess she feels this is more effective as the medication effects peak during the day. She reports some difficulty with sleep which is a long standing issue.\par \par . LDL and T chol was elevated at last visit with psychiatrist. PT scheduled to meet with new PCP on 11/7 - Dr. Emily Leal - [phone]. Pt denies any change from baseline anxiety, mood. She denies SI/HI or AH. \par \par .\par .\par BH MEDICATION SIDE EFFECTS:\par [[ ]]  Muscular rigidity\par [[ ]]  Sexual dysfunction\par [[ ]]  Amenorrhea\par [[ ]]  Galactorrhea\par [[ ]]  Weight gain\par [[ ]]  Weight loss\par [[ ]]  Excess sedation\par [[ ]]  Akathisia\par [[ ]]  Abnormal involuntary movements\par [[ ]]  Sialorrhea\par [[ ]]  Increased appetite\par [[ ]]  Decreased appetite\par [[ ]]  Cognitive blunting\par [[ ]]  Amotivation\par [[ ]]  Nausea/vomiting/diarrhea\par [[ ]]  Constipation\par \par DETAILS:\par [ ]\par

## 2022-12-05 NOTE — SOCIAL HISTORY
[FreeTextEntry1] : RACE/ETHNICITY:   \par White\par GENDER IDENTITY:     \par Female\par SEXUAL IDENTITY:\par Heterosexual\par MARITAL STATUS:  \par \par PREFERRED LANGUAGE:    \par English\par OTHER LANGUAGES SPOKEN:\par Polish\par [employer] AFFILIATION:\par [employer]\par PLACE OF BIRTH:\par [place], [place]\par DEVELOPMENTAL HISTORY (DELAYED MILESTONES: SPEECH, MOTOR, FINE MOTOR, SOCIAL; HISTORY OF IN UTERO EXPOSURE, BIRTH HISTORY, SIBLING RIVALRY)\par Unremarkable developmental history noted/reported\par SCHOOL TYPE/GRADE:\par [[X ]] PUBLIC\par [[ ]] PRIVATE\par [[ ]] CHARTER\par [[ ]] OTHER:  [ ]\par GRADE:  completed bachelor degree at [employer] \par PROBLEMS IN SCHOOL (LEARNING AND BEHAVIORAL PROBLEMS, HISTORY OF IEP/504):\par None reported/noted\par SIGNIFICANT MEMBERS OF HOUSEHOLD (CHILDREN, PARENTS, SIBLINGS):\par Grew up with parents and younger brother (23yo) and twin sister; currently living with \par PAST/CURRENT RELATIONSHIP WITH FAMILY: \par Reports being "close" with family/parents\par PAST/CURRENT RELATIONSHIPS WITH SIGNIFICANT OTHERS: \par "Good" relationship w//others\par SIGNIFICANT LOSSES (DIVORCE, NEGLECT, ETC.):\par None reported\par WORK HISTORY (PAST AND CURRENT EMPLOYMENT):\par 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

## 2022-12-05 NOTE — PLAN
[Yes. details: ___] : Yes, [unfilled] [Medication education provided] : Medication education provided. [Rationale for medication choices, possible risks/precautions, benefits, alternative treatment choices, and consequences of non-treatment discussed] : Rationale for medication choices, possible risks/precautions, benefits, alternative treatment choices, and consequences of non-treatment discussed with patient/family/caregiver  [Order(s) for labs placed in [facility]] : Labs [FreeTextEntry5] : PLAN: #) continue seroquel ER 800mg PO QHS #) continue lamictal 200mg PO QD #) continue buspirone 5mg Po TID #) f/u with PCP re: tachycardia -visit scheduled on 11/17 #) RN visits Q 3 months for VS, AIMS etc \par

## 2022-12-05 NOTE — PAST MEDICAL HISTORY
[FreeTextEntry1] : PAST OUTPATIENT TREATMENT (PSYCHOTHERAPY, PSYCHOPHARMACOLOGY, PSYCHOLOGICAL TESTING):\par [facility] [id number] (freshman year) for therapy and medication management; DBT IOP Summer 2019; DBT outpatient therapist Adolfo Randolph fall 2019- spring 2021; psychiatrist Dr. Crowell for medication mgmt fall 2019\par PAST PSYCHIATRIC MEDICATIONS (NAMES, DOSES, DATES TAKEN, EFFECTIVENESS):\par Sertraline from 8750-9846\par Abilify, Busbar, Gabapentin until Summer of 2020\par Seroquel ER 400mg PO BID, Lamictal 200mg PO Qam, Propranolol 5mg PO TID PRN anxiety (current medications)\par PAST HOSPITALIZATIONS (DATES, REASON FOR ADMISSION, LENGTH OF STAY, TREATMENT, LAST PSYCHIATRIC TREATMENT):\par [facility] voluntary admission due to paranoid ideation, CAH to harm self June-July 2021 for 2.5 weeks\par DATE OF LAST PHYSICAL EXAMINATION:\par Unknown\par

## 2022-12-05 NOTE — DISCUSSION/SUMMARY
[FreeTextEntry1] : Isabella Hensley 28yo white cis heterosexual Mosque female,  and recently moved to [place] with her . She has a history of depression, social anxiety, disordered eating, dissociative symptoms, and psychotic experiences. She has one previous inpatient hospitalization in [place] from June-July 2021 precipitated by CAH becoming more intense and telling her to kill herself. She describes the voices as “other people’s thoughts about her”. In the past year she has been in the Step program (FEP) at [employer]. She is currently stable on Seroquel ER 400mg PO BID, Lamictal 200mg PO Qam, buspirone 5mg PO TID PRN anxiety. Pt notes a history of childhood social and generalized anxiety and disordered eating in adolescence – excessive exercise and food restriction. During her senior year of HS she experienced a MDE marked by depressed mood, excessive sleep, “dark thoughts”, difficulty with concentration resulting in academic decline, and anhedonia. She first sought treatment her Freshman year of College in the Fall of 2015 at [employer] [facility] where she was prescribed sertraline from [id number]. She discontinued in 2017. In 2015 she first started hearing transient voices and dissociative phenomenon but was able to reality test. In 2017 she dropped out of mental health treatment while undergoing chemotherapy for Hodgkin’s Lymphoma. She resumed tx in Spring 2019 at which time she experienced more “out of body” dissociative symptoms. She was in treatment with a therapist, Dr. Youssef then started a DBT IOP in summer of 2019 where she was seen by a psych NP for 3 months and started on a low dose of abilify. In Fall of 2019 she started treatment with psychiatrist, Dr. Crowell and therapist Adolfo Randolph (Fall 2019-Sring 2021). Dr. Crowell prescribed abilify, busbar, gabapentin until Summer of 2020. She was not on meds btw summer 2020 and 2021 at which time she was hospitalized in [place] and started on Seroquel after trial s of Haldol and high dose abilify. She was discharged on Seroquel IR 400mg which was changed to 400mg ER BID in Feb 2022. In Fall of 2021 lamictal 200mg PO Qam was added due to rapid cycling mood. She is also prescribed propranolol 5mg PO TID PRN anxiety. Patient started to meet threshold for psychosis in Summer 2020 around the time she graduated from college. 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 She also started experiencing thought broadcasting, thought control, ideas of reference. SI in the past with some preparatory behavior – see above for details. History of self injurious behavior in 2015 including hitting head with phone, scratching self and stabbing self with pen. Trauma: no trauma hx. Substance: infrequent social drinking. Fam Hx: Father – anxiety, paranoia; brother- depression, ADHD, Sister: panic attacks. Social Hx: Grew up in [facility], twin sister, 25yo brther, 16yo sister. Father is a  at [employer] and mother was teacher and stay at home parent. Patient studied psychology at [employer] and is currently working toward [employer] certification. Medical Hx: Hodgin’s Lymphoma summer 2017, in remission; asthma, eczema. Allergies: NKDA. \par \par 30 minutes spent: reviewed chart, provided psychoeducation, reviewed meds - risks vs benefits and potential side effects. Previously discussed lifestyle modifications to address elevated lipid profile. F/U w/ PCP. Shared decision to continue with consolidated dose of seroquel - currently taking in the am.\par

## 2022-12-06 ENCOUNTER — APPOINTMENT (OUTPATIENT)
Dept: PSYCHIATRY | Facility: CLINIC | Age: 27
End: 2022-12-06

## 2022-12-13 ENCOUNTER — APPOINTMENT (OUTPATIENT)
Dept: PSYCHIATRY | Facility: CLINIC | Age: 27
End: 2022-12-13

## 2022-12-14 ENCOUNTER — APPOINTMENT (OUTPATIENT)
Dept: PSYCHIATRY | Facility: CLINIC | Age: 27
End: 2022-12-14

## 2022-12-15 ENCOUNTER — APPOINTMENT (OUTPATIENT)
Dept: PSYCHIATRY | Facility: CLINIC | Age: 27
End: 2022-12-15

## 2022-12-16 NOTE — DISCUSSION/SUMMARY
[Multi-Family (minimum 60 min)] : Multi-Family [Once a week] : once a week [Other: ____] : [unfilled] [de-identified] : [facility] Social Skills group; via telehealth platform; Tacos Hernandez is co- facilitator of the group (absent today) ; 3 group members in total today.  [FreeTextEntry8] : Review of boundaries, respect and safety and use of "i" statements, angel luis and thorn of the day, expressing emotions to maintain conversation, feelings wheel and ways in which psychotic symptoms might obstruct expression of emotions.  [FreeTextEntry4] : .\par .\par BEHAVIOR IN GROUP\par [[x ]] Showed insight \par [[x ]] Showed interest\par [[ ]] Showed leadership\par [[x ]] Active in discussion\par [[x ]] Offered constructive input\par [[x ]] Supportive to others\par [[ ]] Not supportive to others\par [[ ]] No apparent interest\par [[ ]] Quietly engaged\par [[ ]] Withdrawn\par [[ ]] Appeared distracted\par [[ ]] Disruptive\par INDIVIDUAL’S MOOD:\par [[ x]] Stable\par [[ ]] Depressed/Sad\par [[ ]] ANGRY\par [[ x]] Anxious\par [[ x]] Other  [reported feeling stressed due to awaiting blood test results ]\par NEW ISSUES / STRESSORS / EXTRAORDINARY EVENTS PRESENTED TODAY:\par [[ x]] New Issue Resolved, No Update Required ; reported feelings of stress surrounding blood test results that are yet to come in\par [[ ]] New Issue, Treatment Plan  Update Required\par [[ ]] None Reported \par Details:  Isabella's video was on throughout the session, she appeared engaged and utilized the space to share vulnerable feelings including feelings of stress and anxiety surrounding blood test results, engaged with another member over similar feelings and asked and answered questions, actively participated. Can be encouraged to take more leadership. \par \par

## 2022-12-16 NOTE — PLAN
[Return in ____ week(s)] : Return in [unfilled] week(s) [FreeTextEntry2] : Begin to "feel comfortable discussing issues in [her] life" with therapist. \par Decrease anxiety regarding social situations and interactions with others.  [de-identified] : Pt reported current medical hospital admission due to abdominal pain and concerns about recurrence of cancer. Pt reported concerns about whether she was "making up" her symptoms, and fluctuations between being certainty and uncertainty, as well as fluctuations of trust of the doctors as well as the therapist. Patient responded positively to clarifying and supportive interventions regarding her physical and psychological experiences and continued to discuss concerns about her current experiences which may be affected by her psychiatric symptomatology.

## 2022-12-16 NOTE — BEHAVIORAL HEALTH
[Prevent psychological harm to patient or another individual] : Prevent psychological harm to patient or another individual [FreeTextEntry1] : Pt is in the process of connecting with a new therapist and has demonstrated significant anxiety in beginning a new treatment that may be reasonably exacerbated by reading certain elements of behavioral health documentation, due to her diagnosis of schizoaffective disorder and history of substantial anxiety and trust difficulties.  [FreeTextEntry2] : Pt called in to session from [facility] in [facility]. Anxious mood and  congruent affect, thought process and content anxious and obsessive/ruminative, appropriate to discussion, able to be clarified and calmed with reflective interventions.

## 2022-12-16 NOTE — DISCUSSION/SUMMARY
[Multi-Family (minimum 60 min)] : Multi-Family [Once a week] : once a week [Other: ____] : [unfilled] [de-identified] : [facility] Social Skills group; via telehealth platform; Tacos Hernandez is co- facilitator of the group (absent today) ; 3 group members in total today.  [FreeTextEntry4] : .\par .\par BEHAVIOR IN GROUP\par [[x ]] Showed insight \par [[x ]] Showed interest\par [[ ]] Showed leadership\par [[x ]] Active in discussion\par [[x ]] Offered constructive input\par [[x ]] Supportive to others\par [[ ]] Not supportive to others\par [[ ]] No apparent interest\par [[ ]] Quietly engaged\par [[ ]] Withdrawn\par [[ ]] Appeared distracted\par [[ ]] Disruptive\par INDIVIDUAL’S MOOD:\par [[ x]] Stable\par [[ ]] Depressed/Sad\par [[ ]] ANGRY\par [[ x]] Anxious\par [[ x]] Other  [reported feeling stressed due to awaiting blood test results ]\par NEW ISSUES / STRESSORS / EXTRAORDINARY EVENTS PRESENTED TODAY:\par [[ x]] New Issue Resolved, No Update Required ; reported feelings of stress surrounding blood test results that are yet to come in\par [[ ]] New Issue, Treatment Plan  Update Required\par [[ ]] None Reported \par Details:  Isabella's video was on throughout the session, she appeared engaged and utilized the space to share vulnerable feelings including feelings of stress and anxiety surrounding blood test results, engaged with another member over similar feelings and asked and answered questions, actively participated. Can be encouraged to take more leadership. \par \par  [FreeTextEntry8] : Review of boundaries, respect and safety and use of "i" statements, angel luis and thorn of the day, expressing emotions to maintain conversation, feelings wheel and ways in which psychotic symptoms might obstruct expression of emotions.

## 2022-12-19 ENCOUNTER — APPOINTMENT (OUTPATIENT)
Dept: PSYCHIATRY | Facility: CLINIC | Age: 27
End: 2022-12-19

## 2022-12-19 PROCEDURE — 99214 OFFICE O/P EST MOD 30 MIN: CPT | Mod: 95

## 2022-12-19 NOTE — HISTORY OF PRESENT ILLNESS
[FreeTextEntry1] : Pt is seen and evaluated. Interval history reviewed. She was recently seen and evaluated overnight at [facility] due to concerns about Hodgkin's recurrence. Pt was found to have elevated WBC but otherwise medically cleared. She has been concerned due to unfamiliar experiences: 1) she is not hearing a voice as she typically does. 2) she is feeling confused and forgetful. She started taking seroquel at night. She divided her dose in half recently for one night. She reports feeling "weirdly calm" but also finds this distressing.\par \par NOTE: Pt switched to fully consolidated dosing of seroquel xr 800mg on 11/18. She started experiencing difficulty in the evening: "Wearing off at night – feel more fragile, fragmented, hard to stay in the present and not end up in different world of things , paranoia". She decided to switch to morning dosing of seroquel xr 800mg on 11/22. In mid-December she switched back to nighttime dosing.\par \par . LDL and T chol was elevated at last visit with psychiatrist. PT scheduled to meet with new PCP on 11/7 - Dr. Emily Leal - [phone]. Pt denies any change from baseline anxiety, mood. She denies SI/HI or AH. \par \par .\par .\par BH MEDICATION SIDE EFFECTS:\par [[ ]]  Muscular rigidity\par [[ ]]  Sexual dysfunction\par [[ ]]  Amenorrhea\par [[ ]]  Galactorrhea\par [[ ]]  Weight gain\par [[ ]]  Weight loss\par [[ ]]  Excess sedation\par [[ ]]  Akathisia\par [[ ]]  Abnormal involuntary movements\par [[ ]]  Sialorrhea\par [[ ]]  Increased appetite\par [[ ]]  Decreased appetite\par [[ ]]  Cognitive blunting\par [[ ]]  Amotivation\par [[ ]]  Nausea/vomiting/diarrhea\par [[ ]]  Constipation\par \par DETAILS:\par [ ]\par

## 2022-12-19 NOTE — PAST MEDICAL HISTORY
[FreeTextEntry1] : PAST OUTPATIENT TREATMENT (PSYCHOTHERAPY, PSYCHOPHARMACOLOGY, PSYCHOLOGICAL TESTING):\par [facility] [id number] (freshman year) for therapy and medication management; DBT IOP Summer 2019; DBT outpatient therapist Adolfo Randolph fall 2019- spring 2021; psychiatrist Dr. Crowell for medication mgmt fall 2019\par PAST PSYCHIATRIC MEDICATIONS (NAMES, DOSES, DATES TAKEN, EFFECTIVENESS):\par Sertraline from 7018-9569\par Abilify, Busbar, Gabapentin until Summer of 2020\par Seroquel ER 400mg PO BID, Lamictal 200mg PO Qam, Propranolol 5mg PO TID PRN anxiety (current medications)\par PAST HOSPITALIZATIONS (DATES, REASON FOR ADMISSION, LENGTH OF STAY, TREATMENT, LAST PSYCHIATRIC TREATMENT):\par [facility] voluntary admission due to paranoid ideation, CAH to harm self June-July 2021 for 2.5 weeks\par DATE OF LAST PHYSICAL EXAMINATION:\par Unknown\par

## 2022-12-19 NOTE — PHYSICAL EXAM
[Average] : average [Cooperative] : cooperative [Clear] : clear [WNL] : within normal limits [FreeTextEntry8] : distressed [de-identified] : anxious

## 2022-12-19 NOTE — PLAN
[Yes. details: ___] : Yes, [unfilled] [Medication education provided] : Medication education provided. [Rationale for medication choices, possible risks/precautions, benefits, alternative treatment choices, and consequences of non-treatment discussed] : Rationale for medication choices, possible risks/precautions, benefits, alternative treatment choices, and consequences of non-treatment discussed with patient/family/caregiver  [Order(s) for labs placed in [facility] EHR] : Labs [FreeTextEntry5] : PLAN: #) prescribed lorazepam 0.5mg PO QD #) continue seroquel ER 800mg PO QHS #) continue lamictal 200mg PO QD #) continue buspirone 5mg Po TID #) f/u with PCP re: tachycardia -visit scheduled on 11/17 #) RN visits Q 3 months for VS, AIMS etc \par

## 2022-12-19 NOTE — SOCIAL HISTORY
[FreeTextEntry1] : RACE/ETHNICITY:   \par White\par GENDER IDENTITY:     \par Female\par SEXUAL IDENTITY:\par Heterosexual\par MARITAL STATUS:  \par \par PREFERRED LANGUAGE:    \par English\par OTHER LANGUAGES SPOKEN:\par Italian\par Scientologist AFFILIATION:\par [employer]\par PLACE OF BIRTH:\par [place], [place]\par DEVELOPMENTAL HISTORY (DELAYED MILESTONES: SPEECH, MOTOR, FINE MOTOR, SOCIAL; HISTORY OF IN UTERO EXPOSURE, BIRTH HISTORY, SIBLING RIVALRY)\par Unremarkable developmental history noted/reported\par SCHOOL TYPE/GRADE:\par [[X ]] PUBLIC\par [[ ]] PRIVATE\par [[ ]] CHARTER\par [[ ]] OTHER:  [ ]\par GRADE:  completed bachelor degree at [employer] \par PROBLEMS IN SCHOOL (LEARNING AND BEHAVIORAL PROBLEMS, HISTORY OF IEP/504):\par None reported/noted\par SIGNIFICANT MEMBERS OF HOUSEHOLD (CHILDREN, PARENTS, SIBLINGS):\par Grew up with parents and younger brother (23yo) and twin sister; currently living with \par PAST/CURRENT RELATIONSHIP WITH FAMILY: \par Reports being "close" with family/parents\par PAST/CURRENT RELATIONSHIPS WITH SIGNIFICANT OTHERS: \par "Good" relationship w//others\par SIGNIFICANT LOSSES (DIVORCE, NEGLECT, ETC.):\par None reported\par WORK HISTORY (PAST AND CURRENT EMPLOYMENT):\par 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

## 2022-12-19 NOTE — DISCUSSION/SUMMARY
[FreeTextEntry1] : Isabella Hensley 28yo white cis heterosexual Pentecostal female,  and recently moved to [place] with her . She has a history of depression, social anxiety, disordered eating, dissociative symptoms, and psychotic experiences. She has one previous inpatient hospitalization in [place] from June-July 2021 precipitated by CAH becoming more intense and telling her to kill herself. She describes the voices as “other people’s thoughts about her”. In the past year she has been in the Step program (FEP) at [employer]. She is currently stable on Seroquel ER 400mg PO BID, Lamictal 200mg PO Qam, buspirone 5mg PO TID PRN anxiety. Pt notes a history of childhood social and generalized anxiety and disordered eating in adolescence – excessive exercise and food restriction. During her senior year of HS she experienced a MDE marked by depressed mood, excessive sleep, “dark thoughts”, difficulty with concentration resulting in academic decline, and anhedonia. She first sought treatment her Freshman year of College in the Fall of 2015 at [facility] where she was prescribed sertraline from [id number]. She discontinued in 2017. In 2015 she first started hearing transient voices and dissociative phenomenon but was able to reality test. In 2017 she dropped out of mental health treatment while undergoing chemotherapy for Hodgkin’s Lymphoma. She resumed tx in Spring 2019 at which time she experienced more “out of body” dissociative symptoms. She was in treatment with a therapist, Dr. Youssef then started a DBT IOP in summer of 2019 where she was seen by a psych NP for 3 months and started on a low dose of abilify. In Fall of 2019 she started treatment with psychiatrist, Dr. rCowell and therapist Adolfo Randolph (Fall 2019-Sring 2021). Dr. Crowell prescribed abilify, busbar, gabapentin until Summer of 2020. She was not on meds btw summer 2020 and 2021 at which time she was hospitalized in [place] and started on Seroquel after trial s of Haldol and high dose abilify. She was discharged on Seroquel IR 400mg which was changed to 400mg ER BID in Feb 2022. In Fall of 2021 lamictal 200mg PO Qam was added due to rapid cycling mood. She is also prescribed propranolol 5mg PO TID PRN anxiety. Patient started to meet threshold for psychosis in Summer 2020 around the time she graduated from college. 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 She also started experiencing thought broadcasting, thought control, ideas of reference. SI in the past with some preparatory behavior – see above for details. History of self injurious behavior in 2015 including hitting head with phone, scratching self and stabbing self with pen. Trauma: no trauma hx. Substance: infrequent social drinking. Fam Hx: Father – anxiety, paranoia; brother- depression, ADHD, Sister: panic attacks. Social Hx: Grew up in [facility] [place], twin sister, 25yo brther, 16yo sister. Father is a  at [employer] and mother was teacher and stay at home parent. Patient studied psychology at [employer] and is currently working toward [employer] certification. Medical Hx: Hodgin’s Lymphoma summer 2017, in remission; asthma, eczema. Allergies: NKDA. \par \par 30 minutes spent: reviewed chart, provided psychoeducation, reviewed meds - risks vs benefits and potential side effects. Previously prescribed lorazepam has been helpful - will prescribe for interim use. Spoke with . Discussed plan to monitor and observe experiences over the next week. Previously discussed lifestyle modifications to address elevated lipid profile. F/U w/ PCP. Shared decision to continue with consolidated dose of seroquel - at bedtime.\par

## 2022-12-20 ENCOUNTER — APPOINTMENT (OUTPATIENT)
Dept: PSYCHIATRY | Facility: CLINIC | Age: 27
End: 2022-12-20

## 2022-12-21 ENCOUNTER — APPOINTMENT (OUTPATIENT)
Dept: PSYCHIATRY | Facility: CLINIC | Age: 27
End: 2022-12-21

## 2022-12-22 ENCOUNTER — APPOINTMENT (OUTPATIENT)
Dept: PSYCHIATRY | Facility: CLINIC | Age: 27
End: 2022-12-22

## 2022-12-22 ENCOUNTER — NON-APPOINTMENT (OUTPATIENT)
Age: 27
End: 2022-12-22

## 2022-12-22 VITALS
HEART RATE: 127 BPM | SYSTOLIC BLOOD PRESSURE: 125 MMHG | WEIGHT: 139.04 LBS | DIASTOLIC BLOOD PRESSURE: 75 MMHG | HEIGHT: 63.75 IN | BODY MASS INDEX: 24.03 KG/M2 | OXYGEN SATURATION: 96 % | TEMPERATURE: 97.7 F | RESPIRATION RATE: 16 BRPM

## 2022-12-22 PROCEDURE — 99215 OFFICE O/P EST HI 40 MIN: CPT | Mod: 95

## 2022-12-22 NOTE — DISCUSSION/SUMMARY
[FreeTextEntry1] : Isabella Hensley 28yo white cis heterosexual Rastafari female,  and recently moved to [place] with her . She has a history of depression, social anxiety, disordered eating, dissociative symptoms, and psychotic experiences. She has one previous inpatient hospitalization in [place] from June-July 2021 precipitated by CAH becoming more intense and telling her to kill herself. She describes the voices as “other people’s thoughts about her”. In the past year she has been in the Step program (FEP) at [facility]. She is currently stable on Seroquel ER 400mg PO BID, Lamictal 200mg PO Qam, buspirone 5mg PO TID PRN anxiety. Pt notes a history of childhood social and generalized anxiety and disordered eating in adolescence – excessive exercise and food restriction. During her senior year of HS she experienced a MDE marked by depressed mood, excessive sleep, “dark thoughts”, difficulty with concentration resulting in academic decline, and anhedonia. She first sought treatment her Freshman year of College in the Fall of 2015 at [facility] where she was prescribed sertraline from 0944-8693. She discontinued in 2017. In 2015 she first started hearing transient voices and dissociative phenomenon but was able to reality test. In 2017 she dropped out of mental health treatment while undergoing chemotherapy for Hodgkin’s Lymphoma. She resumed tx in Spring 2019 at which time she experienced more “out of body” dissociative symptoms. She was in treatment with a therapist, Dr. Youssef then started a DBT IOP in summer of 2019 where she was seen by a psych NP for 3 months and started on a low dose of abilify. In Fall of 2019 she started treatment with psychiatrist, Dr. Crowell and therapist Adolfo Randolph (Fall 2019-Sring 2021). Dr. Crowell prescribed abilify, busbar, gabapentin until Summer of 2020. She was not on meds btw summer 2020 and 2021 at which time she was hospitalized in [place] and started on Seroquel after trial s of Haldol and high dose abilify. She was discharged on Seroquel IR 400mg which was changed to 400mg ER BID in Feb 2022. In Fall of 2021 lamictal 200mg PO Qam was added due to rapid cycling mood. She is also prescribed propranolol 5mg PO TID PRN anxiety. Patient started to meet threshold for psychosis in Summer 2020 around the time she graduated from college. 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 She also started experiencing thought broadcasting, thought control, ideas of reference. SI in the past with some preparatory behavior – see above for details. History of self injurious behavior in 2015 including hitting head with phone, scratching self and stabbing self with pen. Trauma: no trauma hx. Substance: infrequent social drinking. Fam Hx: Father – anxiety, paranoia; brother- depression, ADHD, Sister: panic attacks. Social Hx: Grew up in [place], twin sister, 25yo brther, 18yo sister. Father is a  at [employer] and mother was teacher and stay at home parent. Patient studied psychology at [employer] and is currently working toward [employer] certification. Medical Hx: Hodgin’s Lymphoma summer 2017, in remission; asthma, eczema. Allergies: NKDA. \par \par 60 minutes spent: reviewed chart, provided psychoeducation, reviewed meds - risks vs benefits and potential side effects. Family meeting for crisis planning. See HPI. Will move forward with voluntary hospitalization. Discussed multiple alternative plans to manage pt outpt but she is in too much distress. Previously prescribed lorazepam has been helpful - will prescribe for interim use.Previously discussed lifestyle modifications to address elevated lipid profile. F/U w/ PCP. Shared decision to continue with consolidated dose of seroquel - at bedtime.\par

## 2022-12-22 NOTE — PAST MEDICAL HISTORY
[FreeTextEntry1] : PAST OUTPATIENT TREATMENT (PSYCHOTHERAPY, PSYCHOPHARMACOLOGY, PSYCHOLOGICAL TESTING):\par [facility] [id number] (freshman year) for therapy and medication management; DBT IOP Summer 2019; DBT outpatient therapist Adolfo Randolph fall 2019- spring 2021; psychiatrist Dr. Crowell for medication mgmt fall 2019\par PAST PSYCHIATRIC MEDICATIONS (NAMES, DOSES, DATES TAKEN, EFFECTIVENESS):\par Sertraline from 7590-7684\par Abilify, Busbar, Gabapentin until Summer of 2020\par Seroquel ER 400mg PO BID, Lamictal 200mg PO Qam, Propranolol 5mg PO TID PRN anxiety (current medications)\par PAST HOSPITALIZATIONS (DATES, REASON FOR ADMISSION, LENGTH OF STAY, TREATMENT, LAST PSYCHIATRIC TREATMENT):\par [facility] voluntary admission due to paranoid ideation, CAH to harm self June-July 2021 for 2.5 weeks\par DATE OF LAST PHYSICAL EXAMINATION:\par Unknown\par

## 2022-12-22 NOTE — RISK ASSESSMENT
[FreeTextEntry9] : Low acute suicide risk. Elevated chronic risk given diagnosis, past suicidality with preparatory behavior. Protective factors include supportive family, future orientation, level of education.

## 2022-12-22 NOTE — SOCIAL HISTORY
[FreeTextEntry1] : RACE/ETHNICITY:   \par White\par GENDER IDENTITY:     \par Female\par SEXUAL IDENTITY:\par Heterosexual\par MARITAL STATUS:  \par \par PREFERRED LANGUAGE:    \par English\par OTHER LANGUAGES SPOKEN:\par Vietnamese\par Moravian AFFILIATION:\par [employer]\par PLACE OF BIRTH:\par [place], [place]\par DEVELOPMENTAL HISTORY (DELAYED MILESTONES: SPEECH, MOTOR, FINE MOTOR, SOCIAL; HISTORY OF IN UTERO EXPOSURE, BIRTH HISTORY, SIBLING RIVALRY)\par Unremarkable developmental history noted/reported\par SCHOOL TYPE/GRADE:\par [[X ]] PUBLIC\par [[ ]] PRIVATE\par [[ ]] CHARTER\par [[ ]] OTHER:  [ ]\par GRADE:  completed bachelor degree at [employer] \par PROBLEMS IN SCHOOL (LEARNING AND BEHAVIORAL PROBLEMS, HISTORY OF IEP/504):\par None reported/noted\par SIGNIFICANT MEMBERS OF HOUSEHOLD (CHILDREN, PARENTS, SIBLINGS):\par Grew up with parents and younger brother (25yo) and twin sister; currently living with \par PAST/CURRENT RELATIONSHIP WITH FAMILY: \par Reports being "close" with family/parents\par PAST/CURRENT RELATIONSHIPS WITH SIGNIFICANT OTHERS: \par "Good" relationship w//others\par SIGNIFICANT LOSSES (DIVORCE, NEGLECT, ETC.):\par None reported\par WORK HISTORY (PAST AND CURRENT EMPLOYMENT):\par 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

## 2022-12-22 NOTE — HISTORY OF PRESENT ILLNESS
[FreeTextEntry1] : Pt is seen and evaluated. Interval history reviewed. Emergent meeting with pt, mother, . She has been struggling since dose of Seroquel was consolidated from 400mg PO BID to 800mg PO QD (initially taken in the morning then in the evening.) This was patient’s idea as she felt groggy in the morning. (Dose consolidation was done over the course of several months.) She has taken Seroquel for 1.5 years and never had an issue until recently. Pt first started noticing jerky movements, nausea, fatigue, confusion, trouble with memory and recall in early December but did not report to the undersigned until this week. These experiences amplified. Recap: 1st week of December: muscle twitch, increased heart rate. 2nd week of December: hot/cold chills and aches/apins, abdominal pain. Pt went to ER at [facility] on 12/14 – concern for Hodgkin’s recurrence. Parents spoke with oncologist at [place] and after reviewing imaging and labs no concern for recurrence. Pt went to [facility] ER from 12/16-12/17 - d/c with plan to see psych and PCP. PCP referred to neuro. Both PCP and neuro raised concerns about Seroquel causing all of the above. In the past week pt has been modifying dose drastically from day to day. Unclear total amount she is taking each day but overall less medication. She is concerned about mental confusion and absence of psychotic sxs. “I usually hear a voice.” She is concerned about feeling like her “mind is empty”. She is concerned about physical sxs and mental confusion. Difficult to evaluate and assess but she wishes to explore hospitalization for medication stabilization to begin with complete wash out of Seroquel. There is concern for NMS-related process.\par \par NOTE: Pt switched to fully consolidated dosing of seroquel xr 800mg on 11/18. She started experiencing difficulty in the evening: "Wearing off at night – feel more fragile, fragmented, hard to stay in the present and not end up in different world of things , paranoia". She decided to switch to morning dosing of seroquel xr 800mg on 11/22. In mid-December she switched back to nighttime dosing.\par \par  PCP - Dr. Emily Leal - [phone].\par \par .\par .\par  MEDICATION SIDE EFFECTS:\par [[ ]]  Muscular rigidity\par [[ ]]  Sexual dysfunction\par [[ ]]  Amenorrhea\par [[ ]]  Galactorrhea\par [[ ]]  Weight gain\par [[ ]]  Weight loss\par [[ ]]  Excess sedation\par [[ ]]  Akathisia\par [[ ]]  Abnormal involuntary movements\par [[ ]]  Sialorrhea\par [[ ]]  Increased appetite\par [[ ]]  Decreased appetite\par [[ ]]  Cognitive blunting\par [[ ]]  Amotivation\par [[ ]]  Nausea/vomiting/diarrhea\par [[ ]]  Constipation\par \par DETAILS:\par [ ]\par

## 2022-12-23 ENCOUNTER — APPOINTMENT (OUTPATIENT)
Dept: PSYCHIATRY | Facility: CLINIC | Age: 27
End: 2022-12-23

## 2022-12-23 PROCEDURE — 99214 OFFICE O/P EST MOD 30 MIN: CPT | Mod: 95

## 2022-12-23 RX ORDER — QUETIAPINE 50 MG/1
50 TABLET, FILM COATED, EXTENDED RELEASE ORAL
Qty: 60 | Refills: 0 | Status: ACTIVE | COMMUNITY
Start: 2022-10-27 | End: 1900-01-01

## 2022-12-23 RX ORDER — QUETIAPINE 200 MG/1
200 TABLET, FILM COATED, EXTENDED RELEASE ORAL
Qty: 60 | Refills: 0 | Status: ACTIVE | COMMUNITY
Start: 2022-10-13 | End: 1900-01-01

## 2022-12-23 NOTE — DISCUSSION/SUMMARY
[Multi-Family (minimum 60 min)] : Multi-Family [Once a week] : once a week [Other: ____] : [unfilled] [de-identified] : [facility] Social Skills group; via telehealth platform; Tacos Hernandez is co- facilitator of the group (absent today) ; 3 group members in total today.  [FreeTextEntry8] : Review of boundaries, respect and safety and use of "i" statements, angel luis and thorn of the day, expressing emotions to maintain conversation, feelings wheel and ways in which psychotic symptoms might obstruct expression of emotions.  [FreeTextEntry4] : .\par .\par BEHAVIOR IN GROUP\par [[x ]] Showed insight \par [[x ]] Showed interest\par [[ ]] Showed leadership\par [[x ]] Active in discussion\par [[x ]] Offered constructive input\par [[x ]] Supportive to others\par [[ ]] Not supportive to others\par [[ ]] No apparent interest\par [[ ]] Quietly engaged\par [[ ]] Withdrawn\par [[ ]] Appeared distracted\par [[ ]] Disruptive\par INDIVIDUAL’S MOOD:\par [[ x]] Stable\par [[ ]] Depressed/Sad\par [[ ]] ANGRY\par [[ x]] Anxious\par [[ x]] Other  [reported feeling stressed due to awaiting blood test results ]\par NEW ISSUES / STRESSORS / EXTRAORDINARY EVENTS PRESENTED TODAY:\par [[ x]] New Issue Resolved, No Update Required ; reported feelings of stress surrounding blood test results that are yet to come in\par [[ ]] New Issue, Treatment Plan  Update Required\par [[ ]] None Reported \par Details:  Isabella's video was on throughout the session, she appeared engaged and utilized the space to share vulnerable feelings including feelings of stress and anxiety surrounding blood test results, engaged with another member over similar feelings and asked and answered questions, actively participated. Can be encouraged to take more leadership. \par \par

## 2022-12-23 NOTE — SOCIAL HISTORY
[FreeTextEntry1] : RACE/ETHNICITY:   \par White\par GENDER IDENTITY:     \par Female\par SEXUAL IDENTITY:\par Heterosexual\par MARITAL STATUS:  \par \par PREFERRED LANGUAGE:    \par English\par OTHER LANGUAGES SPOKEN:\par Ukrainian\par Yazidi AFFILIATION:\par [employer]\par PLACE OF BIRTH:\par [place], [place]\par DEVELOPMENTAL HISTORY (DELAYED MILESTONES: SPEECH, MOTOR, FINE MOTOR, SOCIAL; HISTORY OF IN UTERO EXPOSURE, BIRTH HISTORY, SIBLING RIVALRY)\par Unremarkable developmental history noted/reported\par SCHOOL TYPE/GRADE:\par [[X ]] PUBLIC\par [[ ]] PRIVATE\par [[ ]] CHARTER\par [[ ]] OTHER:  [ ]\par GRADE:  completed bachelor degree at [employer] \par PROBLEMS IN SCHOOL (LEARNING AND BEHAVIORAL PROBLEMS, HISTORY OF IEP/504):\par None reported/noted\par SIGNIFICANT MEMBERS OF HOUSEHOLD (CHILDREN, PARENTS, SIBLINGS):\par Grew up with parents and younger brother (25yo) and twin sister; currently living with \par PAST/CURRENT RELATIONSHIP WITH FAMILY: \par Reports being "close" with family/parents\par PAST/CURRENT RELATIONSHIPS WITH SIGNIFICANT OTHERS: \par "Good" relationship w//others\par SIGNIFICANT LOSSES (DIVORCE, NEGLECT, ETC.):\par None reported\par WORK HISTORY (PAST AND CURRENT EMPLOYMENT):\par 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

## 2022-12-23 NOTE — BEHAVIORAL HEALTH
[Prevent psychological harm to patient or another individual] : Prevent psychological harm to patient or another individual [FreeTextEntry1] : Pt is in the process of connecting with a new therapist and has demonstrated significant anxiety in beginning a new treatment that may be reasonably exacerbated by reading certain elements of behavioral health documentation, due to her diagnosis of schizoaffective disorder and history of substantial anxiety and trust difficulties.  [FreeTextEntry2] : Pt attended in-person session. Anxious mood and congruent affect, thought process and content anxious and obsessive/ruminative, appropriate to discussion, able to be clarified and calmed with reflective interventions. Eye contact intermittent.

## 2022-12-23 NOTE — DISCUSSION/SUMMARY
[FreeTextEntry1] : Isabella Hensley 26yo white cis heterosexual Shinto female,  and recently moved to [place] with her . She has a history of depression, social anxiety, disordered eating, dissociative symptoms, and psychotic experiences. She has one previous inpatient hospitalization in [place] from June-July 2021 precipitated by CAH becoming more intense and telling her to kill herself. She describes the voices as “other people’s thoughts about her”. In the past year she has been in the Step program (FEP) at [facility]. She is currently stable on Seroquel ER 400mg PO BID, Lamictal 200mg PO Qam, buspirone 5mg PO TID PRN anxiety. Pt notes a history of childhood social and generalized anxiety and disordered eating in adolescence – excessive exercise and food restriction. During her senior year of HS she experienced a MDE marked by depressed mood, excessive sleep, “dark thoughts”, difficulty with concentration resulting in academic decline, and anhedonia. She first sought treatment her Freshman year of College in the Fall of 2015 at [facility] where she was prescribed sertraline from 5798-8071. She discontinued in 2017. In 2015 she first started hearing transient voices and dissociative phenomenon but was able to reality test. In 2017 she dropped out of mental health treatment while undergoing chemotherapy for Hodgkin’s Lymphoma. She resumed tx in Spring 2019 at which time she experienced more “out of body” dissociative symptoms. She was in treatment with a therapist, Dr. Youssef then started a DBT IOP in summer of 2019 where she was seen by a psych NP for 3 months and started on a low dose of abilify. In Fall of 2019 she started treatment with psychiatrist, Dr. Crowell and therapist Adolfo Randolph (Fall 2019-Sring 2021). Dr. Crowell prescribed abilify, busbar, gabapentin until Summer of 2020. She was not on meds btw summer 2020 and 2021 at which time she was hospitalized in [place] and started on Seroquel after trial s of Haldol and high dose abilify. She was discharged on Seroquel IR 400mg which was changed to 400mg ER BID in Feb 2022. In Fall of 2021 lamictal 200mg PO Qam was added due to rapid cycling mood. She is also prescribed propranolol 5mg PO TID PRN anxiety. Patient started to meet threshold for psychosis in Summer 2020 around the time she graduated from college. 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 She also started experiencing thought broadcasting, thought control, ideas of reference. SI in the past with some preparatory behavior – see above for details. History of self injurious behavior in 2015 including hitting head with phone, scratching self and stabbing self with pen. Trauma: no trauma hx. Substance: infrequent social drinking. Fam Hx: Father – anxiety, paranoia; brother- depression, ADHD, Sister: panic attacks. Social Hx: Grew up in [facility] [place] [facility], twin sister, 25yo brther, 18yo sister. Father is a  at [employer] and mother was teacher and stay at home parent. Patient studied psychology at [employer] and is currently working toward [employer] certification. Medical Hx: Hodgin’s Lymphoma summer 2017, in remission; asthma, eczema. Allergies: NKDA. \par \par 30 minutes spent: reviewed chart, provided psychoeducation, reviewed meds - risks vs benefits and potential side effects. Family meeting for crisis planning. Discussed multiple alternative plans to manage pt outpt. Agreed on continuing with lower dose of seroquel to be taken consistently with prn option. Previously prescribed lorazepam has been helpful - will prescribe for interim use.Previously discussed lifestyle modifications to address elevated lipid profile. F/U w/ PCP.

## 2022-12-23 NOTE — RISK ASSESSMENT
[No, patient denies ideation or behavior] : No, patient denies ideation or behavior [No] : No [FreeTextEntry9] : Low acute suicide risk. Elevated chronic risk given diagnosis, past suicidality with preparatory behavior. Protective factors include supportive family, future orientation, level of education.

## 2022-12-23 NOTE — PAST MEDICAL HISTORY
[FreeTextEntry1] : PAST OUTPATIENT TREATMENT (PSYCHOTHERAPY, PSYCHOPHARMACOLOGY, PSYCHOLOGICAL TESTING):\par [facility] [id number] (freshman year) for therapy and medication management; DBT IOP Summer 2019; DBT outpatient therapist Adolfo Randolph fall 2019- spring 2021; psychiatrist Dr. Crowell for medication mgmt fall 2019\par PAST PSYCHIATRIC MEDICATIONS (NAMES, DOSES, DATES TAKEN, EFFECTIVENESS):\par Sertraline from 9970-8900\par Abilify, Busbar, Gabapentin until Summer of 2020\par Seroquel ER 400mg PO BID, Lamictal 200mg PO Qam, Propranolol 5mg PO TID PRN anxiety (current medications)\par PAST HOSPITALIZATIONS (DATES, REASON FOR ADMISSION, LENGTH OF STAY, TREATMENT, LAST PSYCHIATRIC TREATMENT):\par [facility] voluntary admission due to paranoid ideation, CAH to harm self June-July 2021 for 2.5 weeks\par DATE OF LAST PHYSICAL EXAMINATION:\par Unknown\par

## 2022-12-23 NOTE — PHYSICAL EXAM
[Average] : average [Cooperative] : cooperative [Clear] : clear [WNL] : within normal limits [FreeTextEntry8] : distressed [de-identified] : anxious

## 2022-12-23 NOTE — PLAN
[Yes. details: ___] : Yes, [unfilled] [Medication education provided] : Medication education provided. [Rationale for medication choices, possible risks/precautions, benefits, alternative treatment choices, and consequences of non-treatment discussed] : Rationale for medication choices, possible risks/precautions, benefits, alternative treatment choices, and consequences of non-treatment discussed with patient/family/caregiver  [Order(s) for labs placed in [facility] EHR] : Labs [FreeTextEntry5] : PLAN: #) prescribed lorazepam 0.5mg PO QD PRN #) Take seroquel ER 400mg PO BID can take additional 50mg if needed #) continue lamictal 200mg PO QD #) continue buspirone 5mg Po TID #) f/u with PCP re: tachycardia -visit scheduled on 11/17 #) RN visits Q 3 months for VS, AIMS etc #) PC to see pt twice next week - low threshold for hospitalization  \par

## 2022-12-23 NOTE — PLAN
[Return in ____ week(s)] : Return in [unfilled] week(s) [FreeTextEntry2] : Begin to "feel comfortable discussing issues in [her] life" with therapist. \par Decrease anxiety regarding social situations and interactions with others.  [de-identified] : Pt reported desire for voluntary hospitalization in consultation with Dr. Alexsandra Purcell, for the purpose of medication changes. Voluntary hospitalization at [facility] was discussed. What to expect in inpatient hospitalization was discussed.  Patient responded positively to clarifying and supportive interventions around her decision making, and regarding her physical and psychological experiences, and continued to discuss concerns about her current experiences which may be affected by her psychiatric symptomatology.

## 2022-12-23 NOTE — HISTORY OF PRESENT ILLNESS
[FreeTextEntry1] : Pt is seen and evaluated. Interval history reviewed. Pt decided against going to the hospital after detailed plan was discussed yesterday.  joins for session. Pt wishes to continue on lower dose of seroquel XR 200mg PO BID. She is open to taking an additional 50mg PO BID if needed. She expresses concerns about NMS. She denies SI/HI/AVH. She was able to tolerate 200mg PO BID. She continues to experience some mental confusion. \par \par NOTE: Pt switched to fully consolidated dosing of seroquel xr 800mg on 11/18. She started experiencing difficulty in the evening: "Wearing off at night – feel more fragile, fragmented, hard to stay in the present and not end up in different world of things , paranoia". She decided to switch to morning dosing of seroquel xr 800mg on 11/22. In mid-December she switched back to nighttime dosing.\par \par  PCP - Dr. Emily Leal - [phone].\par \par .\par .\par BH MEDICATION SIDE EFFECTS:\par [[ ]]  Muscular rigidity\par [[ ]]  Sexual dysfunction\par [[ ]]  Amenorrhea\par [[ ]]  Galactorrhea\par [[ ]]  Weight gain\par [[ ]]  Weight loss\par [[ ]]  Excess sedation\par [[ ]]  Akathisia\par [[ ]]  Abnormal involuntary movements\par [[ ]]  Sialorrhea\par [[ ]]  Increased appetite\par [[ ]]  Decreased appetite\par [[ ]]  Cognitive blunting\par [[ ]]  Amotivation\par [[ ]]  Nausea/vomiting/diarrhea\par [[ ]]  Constipation\par \par DETAILS:\par [ ]\par

## 2022-12-27 ENCOUNTER — APPOINTMENT (OUTPATIENT)
Dept: PSYCHIATRY | Facility: CLINIC | Age: 27
End: 2022-12-27

## 2022-12-28 ENCOUNTER — APPOINTMENT (OUTPATIENT)
Dept: PSYCHIATRY | Facility: CLINIC | Age: 27
End: 2022-12-28

## 2023-01-03 ENCOUNTER — APPOINTMENT (OUTPATIENT)
Dept: PSYCHIATRY | Facility: CLINIC | Age: 28
End: 2023-01-03

## 2023-01-04 ENCOUNTER — APPOINTMENT (OUTPATIENT)
Dept: PSYCHIATRY | Facility: CLINIC | Age: 28
End: 2023-01-04

## 2023-01-04 ENCOUNTER — APPOINTMENT (OUTPATIENT)
Dept: PSYCHIATRY | Facility: CLINIC | Age: 28
End: 2023-01-04
Payer: COMMERCIAL

## 2023-01-04 PROCEDURE — 99215 OFFICE O/P EST HI 40 MIN: CPT | Mod: 95

## 2023-01-04 NOTE — PLAN
[Yes. details: ___] : Yes, [unfilled] [Medication education provided] : Medication education provided. [Rationale for medication choices, possible risks/precautions, benefits, alternative treatment choices, and consequences of non-treatment discussed] : Rationale for medication choices, possible risks/precautions, benefits, alternative treatment choices, and consequences of non-treatment discussed with patient/family/caregiver  [Order(s) for labs placed in [facility] EHR] : Labs [FreeTextEntry5] : PLAN: #) discuss with team #) further screening of dissociative sxs at next visit #) prescribed lorazepam 0.5mg PO QD PRN #) Take seroquel ER 200mg PO BID can take additional 50mg if needed #) continue lamictal 200mg PO QD #) continue buspirone 5mg Po TID #) RN visits Q 3 months for VS, AIMS etc\par

## 2023-01-04 NOTE — HISTORY OF PRESENT ILLNESS
[FreeTextEntry1] : Pt decided not to join meeting. The undersigned meets with pts  and mother. They share that pt is doing relatively well on 200mg of seroquel ER BID - feeling less dissociated, with less side effects (improved heart rate), and no increase in psychotic sxs. Per  and mother, pt does not wish to work with PC and feels triggered by encounters with psychiatrist. PT feels she is struggling with a dissociative disorder and may have been misdiagnosed. She is seeking alternative evaluations and pt and family wonder if [facility] is the best fit.  shares that pt did not have discrete childhood traumas but has always been highly sensitive, imaginative, and may have internalized many experiences as traumatic. From a young age she felt that other people knew what was going on in her head and did not respond to her needs. She has reportedly expressed feelings of neglect in childhood and reported challenging dynamics with twin.\par \par NOTE: Pt switched to fully consolidated dosing of seroquel xr 800mg on 11/18. She started experiencing difficulty in the evening: "Wearing off at night – feel more fragile, fragmented, hard to stay in the present and not end up in different world of things , paranoia". She decided to switch to morning dosing of seroquel xr 800mg on 11/22. In mid-December she switched back to nighttime dosing.\par \par  PCP - Dr. Emily Leal - [phone].\par \par .\par .\par BH MEDICATION SIDE EFFECTS:\par [[ ]]  Muscular rigidity\par [[ ]]  Sexual dysfunction\par [[ ]]  Amenorrhea\par [[ ]]  Galactorrhea\par [[ ]]  Weight gain\par [[ ]]  Weight loss\par [[ ]]  Excess sedation\par [[ ]]  Akathisia\par [[ ]]  Abnormal involuntary movements\par [[ ]]  Sialorrhea\par [[ ]]  Increased appetite\par [[ ]]  Decreased appetite\par [[ ]]  Cognitive blunting\par [[ ]]  Amotivation\par [[ ]]  Nausea/vomiting/diarrhea\par [[ ]]  Constipation\par \par DETAILS:\par [ ]\par

## 2023-01-04 NOTE — DISCUSSION/SUMMARY
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 She has a history of depression, social anxiety, disordered eating, dissociative symptoms, and psychotic experiences. She has one previous inpatient hospitalization in [place] from June-July 2021 precipitated by CAH becoming more intense and telling her to kill herself. She describes the voices as “other people’s thoughts about her”. In the past year she has been in the Step program (FEP) at [facility]. She is currently stable on Seroquel ER 400mg PO BID, Lamictal 200mg PO Qam, buspirone 5mg PO TID PRN anxiety. Pt notes a history of childhood social and generalized anxiety and disordered eating in adolescence – excessive exercise and food restriction. During her senior year of HS she experienced a MDE marked by depressed mood, excessive sleep, “dark thoughts”, difficulty with concentration resulting in academic decline, and anhedonia. She first sought treatment her Freshman year of College in the Fall of 2015 at [facility] where she was prescribed sertraline from 1388-1715. She discontinued in 2017. In 2015 she first started hearing transient voices and dissociative phenomenon but was able to reality test. In 2017 she dropped out of mental health treatment while undergoing chemotherapy for Hodgkin’s Lymphoma. She resumed tx in Spring 2019 at which time she experienced more “out of body” dissociative symptoms. She was in treatment with a therapist, Dr. Youssef then started a DBT IOP in summer of 2019 where she was seen by a psych NP for 3 months and started on a low dose of abilify. In Fall of 2019 she started treatment with psychiatrist, Dr. Crowell and therapist Adolfo Randolph (Fall 2019-Sring 2021). Dr. Crowell prescribed abilify, busbar, gabapentin until Summer of 2020. She was not on meds btw summer 2020 and 2021 at which time she was hospitalized in [place] and started on Seroquel after trial s of Haldol and high dose abilify. She was discharged on Seroquel IR 400mg which was changed to 400mg ER BID in Feb 2022. In Fall of 2021 lamictal 200mg PO Qam was added due to rapid cycling mood. She is also prescribed propranolol 5mg PO TID PRN anxiety. Patient started to meet threshold for psychosis in Summer 2020 around the time she graduated from college. 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 She also started experiencing thought broadcasting, thought control, ideas of reference. SI in the past with some preparatory behavior – see above for details. History of self injurious behavior in 2015 including hitting head with phone, scratching self and stabbing self with pen. Trauma: no trauma hx. Substance: infrequent social drinking. Fam Hx: Father – anxiety, paranoia; brother- depression, ADHD, Sister: panic attacks. Social Hx: Grew up in [place] [facility] [place], twin sister, 25yo brther, 18yo sister. Father is a  at [facility] [place] [facility] and mother was teacher and stay at home parent. Patient studied psychology at [employer] [facility] and is currently working toward [employer] certification. Medical Hx: Hodgin’s Lymphoma summer 2017, in remission; asthma, eczema. Allergies: NKDA. \par \par Time spent: reviewed chart, provided psychoeducation, reviewed meds - risks vs benefits and potential side effects. Family meeting w/o pt - family shared discnostic and tx concerns and issues related to pts relationship with PC.  Agreed on continuing with lower dose of seroquel to be taken consistently with prn option. Previously prescribed lorazepam has been helpful - will prescribe for interim use.Previously discussed lifestyle modifications to address elevated lipid profile.

## 2023-01-04 NOTE — PAST MEDICAL HISTORY
[FreeTextEntry1] : PAST OUTPATIENT TREATMENT (PSYCHOTHERAPY, PSYCHOPHARMACOLOGY, PSYCHOLOGICAL TESTING):\par [facility] [id number] (freshman year) for therapy and medication management; DBT IOP Summer 2019; DBT outpatient therapist Adolfo Randolph fall 2019- spring 2021; psychiatrist Dr. Crowell for medication mgmt fall 2019\par PAST PSYCHIATRIC MEDICATIONS (NAMES, DOSES, DATES TAKEN, EFFECTIVENESS):\par Sertraline from 3767-5615\par Abilify, Busbar, Gabapentin until Summer of 2020\par Seroquel ER 400mg PO BID, Lamictal 200mg PO Qam, Propranolol 5mg PO TID PRN anxiety (current medications)\par PAST HOSPITALIZATIONS (DATES, REASON FOR ADMISSION, LENGTH OF STAY, TREATMENT, LAST PSYCHIATRIC TREATMENT):\par [facility] voluntary admission due to paranoid ideation, CAH to harm self June-July 2021 for 2.5 weeks\par DATE OF LAST PHYSICAL EXAMINATION:\par Unknown\par

## 2023-01-04 NOTE — REASON FOR VISIT
[Patient] : Patient [Home] : at home, [unfilled] , at the time of the visit. [Other Location: e.g. Home (Enter Location, City,State)___] : at [unfilled] [Self] : self [FreeTextEntry1] : Psychiatric Follow Up [FreeTextEntry3] : Mother and

## 2023-01-04 NOTE — SOCIAL HISTORY
[Lives with Spouse] : lives with spouse [Unemployed] : unemployed [] :  [College] : College [None] : none [Yes] : yes [No Known Use] : no known use [FreeTextEntry1] : RACE/ETHNICITY:   \par White\par GENDER IDENTITY:     \par Female\par SEXUAL IDENTITY:\par Heterosexual\par MARITAL STATUS:  \par \par PREFERRED LANGUAGE:    \par English\par OTHER LANGUAGES SPOKEN:\par Persian\par Congregation AFFILIATION:\par Church\par PLACE OF BIRTH:\par [place], [place]\par DEVELOPMENTAL HISTORY (DELAYED MILESTONES: SPEECH, MOTOR, FINE MOTOR, SOCIAL; HISTORY OF IN UTERO EXPOSURE, BIRTH HISTORY, SIBLING RIVALRY)\par Unremarkable developmental history noted/reported\par SCHOOL TYPE/GRADE:\par [[X ]] PUBLIC\par [[ ]] PRIVATE\par [[ ]] CHARTER\par [[ ]] OTHER:  [ ]\par GRADE:  completed bachelor degree at [employer] \par PROBLEMS IN SCHOOL (LEARNING AND BEHAVIORAL PROBLEMS, HISTORY OF IEP/504):\par None reported/noted\par SIGNIFICANT MEMBERS OF HOUSEHOLD (CHILDREN, PARENTS, SIBLINGS):\par Grew up with parents and younger brother (23yo) and twin sister; currently living with \par PAST/CURRENT RELATIONSHIP WITH FAMILY: \par Reports being "close" with family/parents\par PAST/CURRENT RELATIONSHIPS WITH SIGNIFICANT OTHERS: \par "Good" relationship w//others\par SIGNIFICANT LOSSES (DIVORCE, NEGLECT, ETC.):\par None reported\par WORK HISTORY (PAST AND CURRENT EMPLOYMENT):\par 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

## 2023-01-06 NOTE — BEHAVIORAL HEALTH
[Prevent psychological harm to patient or another individual] : Prevent psychological harm to patient or another individual [FreeTextEntry1] : Pt is in the process of connecting with a new therapist and has demonstrated significant anxiety in beginning a new treatment that may be reasonably exacerbated by reading certain elements of behavioral health documentation, due to her diagnosis of schizoaffective disorder and history of substantial anxiety and trust difficulties.  [FreeTextEntry2] : Pt attended phone session. Anxious mood and congruent affect, thought process and content anxious and obsessive/ruminative, appropriate to discussion, able to be clarified and calmed with reflective interventions.

## 2023-01-06 NOTE — PLAN
[Return in ____ week(s)] : Return in [unfilled] week(s) [FreeTextEntry2] : Begin to "feel comfortable discussing issues in [her] life" with therapist. \par Decrease anxiety regarding social situations and interactions with others.  [de-identified] : Pt reports continuing concerns about somatic symptoms and not understanding what is causing them. Pt reports concerns about cancer recurrence. Pt reports concerns about not being understood or not being believed regarding her symptoms due to psychiatric diagnosis.  Patient responded positively to clarifying and supportive interventions around her thinking and decision making, and regarding her physical and psychological experiences, and continued to discuss concerns about her current experiences which may be affected by her psychiatric symptomatology but are not necessarily determined by them.

## 2023-01-10 ENCOUNTER — APPOINTMENT (OUTPATIENT)
Dept: PSYCHIATRY | Facility: CLINIC | Age: 28
End: 2023-01-10

## 2023-01-11 ENCOUNTER — APPOINTMENT (OUTPATIENT)
Dept: PSYCHIATRY | Facility: CLINIC | Age: 28
End: 2023-01-11
Payer: COMMERCIAL

## 2023-01-11 ENCOUNTER — APPOINTMENT (OUTPATIENT)
Dept: PSYCHIATRY | Facility: CLINIC | Age: 28
End: 2023-01-11

## 2023-01-11 PROCEDURE — 99215 OFFICE O/P EST HI 40 MIN: CPT | Mod: 95

## 2023-01-12 NOTE — SOCIAL HISTORY
[Lives with Spouse] : lives with spouse [Unemployed] : unemployed [] :  [College] : College [None] : none [Yes] : yes [No Known Use] : no known use [FreeTextEntry1] : RACE/ETHNICITY:   \par White\par GENDER IDENTITY:     \par Female\par SEXUAL IDENTITY:\par Heterosexual\par MARITAL STATUS:  \par \par PREFERRED LANGUAGE:    \par English\par OTHER LANGUAGES SPOKEN:\par Lithuanian\par Methodist AFFILIATION:\par [employer]\par PLACE OF BIRTH:\par [place], [place]\par DEVELOPMENTAL HISTORY (DELAYED MILESTONES: SPEECH, MOTOR, FINE MOTOR, SOCIAL; HISTORY OF IN UTERO EXPOSURE, BIRTH HISTORY, SIBLING RIVALRY)\par Unremarkable developmental history noted/reported\par SCHOOL TYPE/GRADE:\par [[X ]] PUBLIC\par [[ ]] PRIVATE\par [[ ]] CHARTER\par [[ ]] OTHER:  [ ]\par GRADE:  completed bachelor degree at [employer] \par PROBLEMS IN SCHOOL (LEARNING AND BEHAVIORAL PROBLEMS, HISTORY OF IEP/504):\par None reported/noted\par SIGNIFICANT MEMBERS OF HOUSEHOLD (CHILDREN, PARENTS, SIBLINGS):\par Grew up with parents and younger brother (25yo) and twin sister; currently living with \par PAST/CURRENT RELATIONSHIP WITH FAMILY: \par Reports being "close" with family/parents\par PAST/CURRENT RELATIONSHIPS WITH SIGNIFICANT OTHERS: \par "Good" relationship w//others\par SIGNIFICANT LOSSES (DIVORCE, NEGLECT, ETC.):\par None reported\par WORK HISTORY (PAST AND CURRENT EMPLOYMENT):\par 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

## 2023-01-12 NOTE — PAST MEDICAL HISTORY
[FreeTextEntry1] : PAST OUTPATIENT TREATMENT (PSYCHOTHERAPY, PSYCHOPHARMACOLOGY, PSYCHOLOGICAL TESTING):\par [facility] [id number] (freshman year) for therapy and medication management; DBT IOP Summer 2019; DBT outpatient therapist Adolfo Randolph fall 2019- spring 2021; psychiatrist Dr. Crowell for medication mgmt fall 2019\par PAST PSYCHIATRIC MEDICATIONS (NAMES, DOSES, DATES TAKEN, EFFECTIVENESS):\par Sertraline from 2771-3319\par Abilify, Busbar, Gabapentin until Summer of 2020\par Seroquel ER 400mg PO BID, Lamictal 200mg PO Qam, Propranolol 5mg PO TID PRN anxiety (current medications)\par PAST HOSPITALIZATIONS (DATES, REASON FOR ADMISSION, LENGTH OF STAY, TREATMENT, LAST PSYCHIATRIC TREATMENT):\par [facility] voluntary admission due to paranoid ideation, CAH to harm self June-July 2021 for 2.5 weeks\par DATE OF LAST PHYSICAL EXAMINATION:\par Unknown\par

## 2023-01-12 NOTE — REASON FOR VISIT
[Patient preference] : as per patient preference [Telehealth (audio & video) - Individual/Group] : This visit was provided via telehealth using real-time 2-way audio visual technology. [Other Location: e.g. Home (Enter Location, City,State)___] : The provider was located at [unfilled]. [Home] : The patient, [unfilled], was located at home, [unfilled], at the time of the visit. [Patient] : Patient [FreeTextEntry4] : 3:30 [FreeTextEntry5] : 4:20 [FreeTextEntry1] : Psychiatric Follow Up

## 2023-01-12 NOTE — DISCUSSION/SUMMARY
[FreeTextEntry1] : Isabella Hensley 28yo white cis heterosexual Sikh female,  and recently moved to [place] with her . She has a history of depression, social anxiety, disordered eating, dissociative symptoms, and psychotic experiences. She has one previous inpatient hospitalization in [place] from June-July 2021 precipitated by CAH becoming more intense and telling her to kill herself. She describes the voices as “other people’s thoughts about her”. In the past year she has been in the Step program (FEP) at [facility]. She is currently stable on Seroquel ER 400mg PO BID, Lamictal 200mg PO Qam, buspirone 5mg PO TID PRN anxiety. Pt notes a history of childhood social and generalized anxiety and disordered eating in adolescence – excessive exercise and food restriction. During her senior year of HS she experienced a MDE marked by depressed mood, excessive sleep, “dark thoughts”, difficulty with concentration resulting in academic decline, and anhedonia. She first sought treatment her Freshman year of College in the Fall of 2015 at [facility] where she was prescribed sertraline from 4514-6065. She discontinued in 2017. In 2015 she first started hearing transient voices and dissociative phenomenon but was able to reality test. In 2017 she dropped out of mental health treatment while undergoing chemotherapy for Hodgkin’s Lymphoma. She resumed tx in Spring 2019 at which time she experienced more “out of body” dissociative symptoms. She was in treatment with a therapist, Dr. Youssef then started a DBT IOP in summer of 2019 where she was seen by a psych NP for 3 months and started on a low dose of abilify. In Fall of 2019 she started treatment with psychiatrist, Dr. Crowell and therapist Adolfo Randolph (Fall 2019-Sring 2021). Dr. Crowell prescribed abilify, busbar, gabapentin until Summer of 2020. She was not on meds btw summer 2020 and 2021 at which time she was hospitalized in [place] and started on Seroquel after trial s of Haldol and high dose abilify. She was discharged on Seroquel IR 400mg which was changed to 400mg ER BID in Feb 2022. In Fall of 2021 lamictal 200mg PO Qam was added due to rapid cycling mood. She is also prescribed propranolol 5mg PO TID PRN anxiety. Patient started to meet threshold for psychosis in Summer 2020 around the time she graduated from college. 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 She also started experiencing thought broadcasting, thought control, ideas of reference. SI in the past with some preparatory behavior – see above for details. History of self injurious behavior in 2015 including hitting head with phone, scratching self and stabbing self with pen. Trauma: no trauma hx. Substance: infrequent social drinking. Fam Hx: Father – anxiety, paranoia; brother- depression, ADHD, Sister: panic attacks. Social Hx: Grew up in [place], twin sister, 25yo brther, 16yo sister. Father is a  at [employer] and mother was teacher and stay at home parent. Patient studied psychology at [employer] and is currently working toward [employer] certification. Medical Hx: Hodgin’s Lymphoma summer 2017, in remission; asthma, eczema. Allergies: NKDA. \par \par Time spent: reviewed chart, provided psychoeducation, reviewed meds - risks vs benefits and potential side effects. Reviewed FABIO-II and engaged in clinical interview for dissociative disorders.   Agreed on continuing with lower dose of seroquel to be taken consistently with prn option. Previously prescribed lorazepam has been helpful - will prescribe for interim use.Previously discussed lifestyle modifications to address elevated lipid profile.

## 2023-01-12 NOTE — PLAN
[Yes. details: ___] : Yes, [unfilled] [Medication education provided] : Medication education provided. [Rationale for medication choices, possible risks/precautions, benefits, alternative treatment choices, and consequences of non-treatment discussed] : Rationale for medication choices, possible risks/precautions, benefits, alternative treatment choices, and consequences of non-treatment discussed with patient/family/caregiver  [Order(s) for labs placed in [facility] EHR] : Labs [FreeTextEntry5] : PLAN: #) further screening of dissociative sxs at next visit #) prescribed lorazepam 0.5mg PO QD PRN #) Take seroquel ER 200mg PO BID can take additional 50mg if needed #) continue lamictal 200mg PO QD #) continue buspirone 5mg Po TID #) RN visits Q 3 months for VS, AIMS etc\par

## 2023-01-12 NOTE — PHYSICAL EXAM
[Well groomed] : well groomed [Average] : average [Anxious] : anxious [Euthymic] : euthymic [Full] : full [Clear] : clear [Linear/Goal Directed] : linear/goal directed [None] : none [None Reported] : none reported [WNL] : within normal limits

## 2023-01-17 ENCOUNTER — APPOINTMENT (OUTPATIENT)
Dept: PSYCHIATRY | Facility: CLINIC | Age: 28
End: 2023-01-17

## 2023-01-18 ENCOUNTER — APPOINTMENT (OUTPATIENT)
Dept: PSYCHIATRY | Facility: CLINIC | Age: 28
End: 2023-01-18

## 2023-01-24 ENCOUNTER — APPOINTMENT (OUTPATIENT)
Dept: PSYCHIATRY | Facility: CLINIC | Age: 28
End: 2023-01-24
Payer: COMMERCIAL

## 2023-01-24 ENCOUNTER — APPOINTMENT (OUTPATIENT)
Dept: PSYCHIATRY | Facility: CLINIC | Age: 28
End: 2023-01-24

## 2023-01-24 DIAGNOSIS — F41.9 ANXIETY DISORDER, UNSPECIFIED: ICD-10-CM

## 2023-01-24 PROCEDURE — 99215 OFFICE O/P EST HI 40 MIN: CPT | Mod: 95

## 2023-01-25 ENCOUNTER — APPOINTMENT (OUTPATIENT)
Dept: PSYCHIATRY | Facility: CLINIC | Age: 28
End: 2023-01-25

## 2023-01-26 RX ORDER — LORAZEPAM 0.5 MG/1
0.5 TABLET ORAL
Qty: 30 | Refills: 0 | Status: ACTIVE | COMMUNITY
Start: 2022-12-19 | End: 1900-01-01

## 2023-01-31 ENCOUNTER — APPOINTMENT (OUTPATIENT)
Dept: PSYCHIATRY | Facility: CLINIC | Age: 28
End: 2023-01-31

## 2023-02-01 ENCOUNTER — APPOINTMENT (OUTPATIENT)
Dept: PSYCHIATRY | Facility: CLINIC | Age: 28
End: 2023-02-01

## 2023-02-01 PROBLEM — F41.9 ANXIETY DISORDER, UNSPECIFIED: Status: ACTIVE | Noted: 2022-09-16

## 2023-02-01 NOTE — HISTORY OF PRESENT ILLNESS
[FreeTextEntry1] : Pt is seen and evaluated. Interval hx reviewed. Pt discontinue all medication since last visit. She only take lorazepam PRN. She is tolerating medication discontinuation (self-initiated). SHe continues to have anxiety which restricts her daily activities. She is no longer meeting with her therapist. She has been seeking alternative providers in the community who specialize in treating dissociative disorders. She and her  having been giving much thought to diagnosis and feel that her experiences are more in line with BPD and dissociative spectrum disorders. \par Pt endorses a variety of symptoms related to depersonalization, derealization, confusion, amnesia.\par \par COnversation ensues Per  and mother, pt does not wish to work with PC and feels triggered by encounters with psychiatrist. PT feels she is struggling with a dissociative disorder and may have been misdiagnosed. She is seeking alternative evaluations and pt and family wonder if [facility] is the best fit.  shares that pt did not have discrete childhood traumas but has always been highly sensitive, imaginative, and may have internalized many experiences as traumatic. From a young age she felt that other people knew what was going on in her head and did not respond to her needs. She has reportedly expressed feelings of neglect in childhood and reported challenging dynamics with twin.\par \par NOTE: Pt switched to fully consolidated dosing of seroquel xr 800mg on 11/18. She started experiencing difficulty in the evening: "Wearing off at night – feel more fragile, fragmented, hard to stay in the present and not end up in different world of things , paranoia". She decided to switch to morning dosing of seroquel xr 800mg on 11/22. In mid-December she switched back to nighttime dosing.\par \par  PCP - Dr. Emily Leal - [phone].\par \par .\par .\par  MEDICATION SIDE EFFECTS:\par [[ ]]  Muscular rigidity\par [[ ]]  Sexual dysfunction\par [[ ]]  Amenorrhea\par [[ ]]  Galactorrhea\par [[ ]]  Weight gain\par [[ ]]  Weight loss\par [[ ]]  Excess sedation\par [[ ]]  Akathisia\par [[ ]]  Abnormal involuntary movements\par [[ ]]  Sialorrhea\par [[ ]]  Increased appetite\par [[ ]]  Decreased appetite\par [[ ]]  Cognitive blunting\par [[ ]]  Amotivation\par [[ ]]  Nausea/vomiting/diarrhea\par [[ ]]  Constipation\par \par DETAILS:\par [ ]\par

## 2023-02-01 NOTE — REASON FOR VISIT
[Patient preference] : as per patient preference [Continuing, patient not seen in-person within last 12 months (provide details below)] : Telehealth services are continuing, patient not seen in-person within last 12 months.  [Telehealth (audio & video) - Individual/Group] : This visit was provided via telehealth using real-time 2-way audio visual technology. [Other Location: e.g. Home (Enter Location, City,State)___] : The provider was located at [unfilled]. [Home] : The patient, [unfilled], was located at home, [unfilled], at the time of the visit. [Verbal consent obtained from patient/other participant(s)] : Verbal consent for telehealth/telephonic services obtained from patient/other participant(s) [FreeTextEntry4] : 01:00 [FreeTextEntry5] : 01:45 [Patient] : Patient [FreeTextEntry1] : Psychiatric follow up

## 2023-02-01 NOTE — SOCIAL HISTORY
[FreeTextEntry1] : RACE/ETHNICITY:   \par White\par GENDER IDENTITY:     \par Female\par SEXUAL IDENTITY:\par Heterosexual\par MARITAL STATUS:  \par \par PREFERRED LANGUAGE:    \par English\par OTHER LANGUAGES SPOKEN:\par Spanish\par Anabaptism AFFILIATION:\par [employer]\par PLACE OF BIRTH:\par [place], [place]\par DEVELOPMENTAL HISTORY (DELAYED MILESTONES: SPEECH, MOTOR, FINE MOTOR, SOCIAL; HISTORY OF IN UTERO EXPOSURE, BIRTH HISTORY, SIBLING RIVALRY)\par Unremarkable developmental history noted/reported\par SCHOOL TYPE/GRADE:\par [[X ]] PUBLIC\par [[ ]] PRIVATE\par [[ ]] CHARTER\par [[ ]] OTHER:  [ ]\par GRADE:  completed bachelor degree at [employer] \par PROBLEMS IN SCHOOL (LEARNING AND BEHAVIORAL PROBLEMS, HISTORY OF IEP/504):\par None reported/noted\par SIGNIFICANT MEMBERS OF HOUSEHOLD (CHILDREN, PARENTS, SIBLINGS):\par Grew up with parents and younger brother (25yo) and twin sister; currently living with \par PAST/CURRENT RELATIONSHIP WITH FAMILY: \par Reports being "close" with family/parents\par PAST/CURRENT RELATIONSHIPS WITH SIGNIFICANT OTHERS: \par "Good" relationship w//others\par SIGNIFICANT LOSSES (DIVORCE, NEGLECT, ETC.):\par None reported\par WORK HISTORY (PAST AND CURRENT EMPLOYMENT):\par 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

## 2023-02-01 NOTE — DISCUSSION/SUMMARY
[FreeTextEntry1] : Isabella Hensley 28yo white cis heterosexual Mu-ism female,  and recently moved to [place] with her . She has a history of depression, social anxiety, disordered eating, dissociative symptoms, and psychotic experiences. She has one previous inpatient hospitalization in [place] from June-July 2021 precipitated by CAH becoming more intense and telling her to kill herself. She describes the voices as “other people’s thoughts about her”. In the past year she has been in the Step program (FEP) at [facility]. She is currently stable on Seroquel ER 400mg PO BID, Lamictal 200mg PO Qam, buspirone 5mg PO TID PRN anxiety. Pt notes a history of childhood social and generalized anxiety and disordered eating in adolescence – excessive exercise and food restriction. During her senior year of HS she experienced a MDE marked by depressed mood, excessive sleep, “dark thoughts”, difficulty with concentration resulting in academic decline, and anhedonia. She first sought treatment her Freshman year of College in the Fall of 2015 at [facility] where she was prescribed sertraline from [id number]. She discontinued in 2017. In 2015 she first started hearing transient voices and dissociative phenomenon but was able to reality test. In 2017 she dropped out of mental health treatment while undergoing chemotherapy for Hodgkin’s Lymphoma. She resumed tx in Spring 2019 at which time she experienced more “out of body” dissociative symptoms. She was in treatment with a therapist, Dr. Youssef then started a DBT IOP in summer of 2019 where she was seen by a psych NP for 3 months and started on a low dose of abilify. In Fall of 2019 she started treatment with psychiatrist, Dr. Crowell and therapist Adolfo Randolph (Fall 2019-Sring 2021). Dr. Crowell prescribed abilify, busbar, gabapentin until Summer of 2020. She was not on meds btw summer 2020 and 2021 at which time she was hospitalized in [place] and started on Seroquel after trial s of Haldol and high dose abilify. She was discharged on Seroquel IR 400mg which was changed to 400mg ER BID in Feb 2022. In Fall of 2021 lamictal 200mg PO Qam was added due to rapid cycling mood. She is also prescribed propranolol 5mg PO TID PRN anxiety. Patient started to meet threshold for psychosis in Summer 2020 around the time she graduated from college. 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 She also started experiencing thought broadcasting, thought control, ideas of reference. SI in the past with some preparatory behavior – see above for details. History of self injurious behavior in 2015 including hitting head with phone, scratching self and stabbing self with pen. Trauma: no trauma hx. Substance: infrequent social drinking. Fam Hx: Father – anxiety, paranoia; brother- depression, ADHD, Sister: panic attacks. Social Hx: G[place] in [facility], twin sister, 25yo brther, 18yo sister. Father is a  at [employer] and mother was teacher and stay at home parent. Patient studied psychology at [employer] and is currently working toward [employer] certification. Medical Hx: Hodgin’s Lymphoma summer 2017, in remission; asthma, eczema. Allergies: NKDA. \par \par Time spent: reviewed chart, provided psychoeducation, reviewed meds - risks vs benefits and potential side effects. Reviewed FABIO-II and engaged in clinical interview for dissociative disorders.   Agreed on continuing with lower dose of seroquel to be taken consistently with prn option. Previously prescribed lorazepam has been helpful - will prescribe for interim use.Previously discussed lifestyle modifications to address elevated lipid profile.

## 2023-02-01 NOTE — PLAN
[Yes. details: ___] : Yes, [unfilled] [Medication education provided] : Medication education provided. [Rationale for medication choices, possible risks/precautions, benefits, alternative treatment choices, and consequences of non-treatment discussed] : Rationale for medication choices, possible risks/precautions, benefits, alternative treatment choices, and consequences of non-treatment discussed with patient/family/caregiver  [Order(s) for labs placed in [facility] EHR] : Labs [FreeTextEntry5] : PLAN: #) further screening of dissociative sxs at next visit #) prescribed lorazepam 0.5mg PO QD PRN #) Pt discontinued all other meds \par

## 2023-02-07 ENCOUNTER — APPOINTMENT (OUTPATIENT)
Dept: PSYCHIATRY | Facility: CLINIC | Age: 28
End: 2023-02-07

## 2023-02-08 ENCOUNTER — APPOINTMENT (OUTPATIENT)
Dept: PSYCHIATRY | Facility: CLINIC | Age: 28
End: 2023-02-08

## 2023-02-14 ENCOUNTER — APPOINTMENT (OUTPATIENT)
Dept: PSYCHIATRY | Facility: CLINIC | Age: 28
End: 2023-02-14

## 2023-02-15 ENCOUNTER — APPOINTMENT (OUTPATIENT)
Dept: PSYCHIATRY | Facility: CLINIC | Age: 28
End: 2023-02-15

## 2023-02-21 ENCOUNTER — APPOINTMENT (OUTPATIENT)
Dept: PSYCHIATRY | Facility: CLINIC | Age: 28
End: 2023-02-21

## 2023-02-22 ENCOUNTER — APPOINTMENT (OUTPATIENT)
Dept: PSYCHIATRY | Facility: CLINIC | Age: 28
End: 2023-02-22

## 2023-02-28 ENCOUNTER — APPOINTMENT (OUTPATIENT)
Dept: PSYCHIATRY | Facility: CLINIC | Age: 28
End: 2023-02-28

## 2023-03-01 ENCOUNTER — APPOINTMENT (OUTPATIENT)
Dept: PSYCHIATRY | Facility: CLINIC | Age: 28
End: 2023-03-01

## 2023-03-07 ENCOUNTER — APPOINTMENT (OUTPATIENT)
Dept: PSYCHIATRY | Facility: CLINIC | Age: 28
End: 2023-03-07

## 2023-03-08 ENCOUNTER — APPOINTMENT (OUTPATIENT)
Dept: PSYCHIATRY | Facility: CLINIC | Age: 28
End: 2023-03-08

## 2023-03-14 ENCOUNTER — APPOINTMENT (OUTPATIENT)
Dept: PSYCHIATRY | Facility: CLINIC | Age: 28
End: 2023-03-14

## 2023-03-15 ENCOUNTER — APPOINTMENT (OUTPATIENT)
Dept: PSYCHIATRY | Facility: CLINIC | Age: 28
End: 2023-03-15

## 2023-03-16 ENCOUNTER — APPOINTMENT (OUTPATIENT)
Dept: PSYCHIATRY | Facility: CLINIC | Age: 28
End: 2023-03-16

## 2023-03-21 ENCOUNTER — APPOINTMENT (OUTPATIENT)
Dept: PSYCHIATRY | Facility: CLINIC | Age: 28
End: 2023-03-21

## 2023-03-22 ENCOUNTER — APPOINTMENT (OUTPATIENT)
Dept: PSYCHIATRY | Facility: CLINIC | Age: 28
End: 2023-03-22

## 2023-03-28 ENCOUNTER — APPOINTMENT (OUTPATIENT)
Dept: PSYCHIATRY | Facility: CLINIC | Age: 28
End: 2023-03-28

## 2023-03-29 ENCOUNTER — APPOINTMENT (OUTPATIENT)
Dept: PSYCHIATRY | Facility: CLINIC | Age: 28
End: 2023-03-29

## 2023-04-04 ENCOUNTER — APPOINTMENT (OUTPATIENT)
Dept: PSYCHIATRY | Facility: CLINIC | Age: 28
End: 2023-04-04

## 2023-04-05 ENCOUNTER — APPOINTMENT (OUTPATIENT)
Dept: PSYCHIATRY | Facility: CLINIC | Age: 28
End: 2023-04-05

## 2023-04-11 ENCOUNTER — APPOINTMENT (OUTPATIENT)
Dept: PSYCHIATRY | Facility: CLINIC | Age: 28
End: 2023-04-11

## 2023-04-12 ENCOUNTER — APPOINTMENT (OUTPATIENT)
Dept: PSYCHIATRY | Facility: CLINIC | Age: 28
End: 2023-04-12

## 2023-04-18 ENCOUNTER — APPOINTMENT (OUTPATIENT)
Dept: PSYCHIATRY | Facility: CLINIC | Age: 28
End: 2023-04-18

## 2023-04-19 ENCOUNTER — APPOINTMENT (OUTPATIENT)
Dept: PSYCHIATRY | Facility: CLINIC | Age: 28
End: 2023-04-19

## 2023-04-21 ENCOUNTER — NON-APPOINTMENT (OUTPATIENT)
Age: 28
End: 2023-04-21

## 2023-04-21 DIAGNOSIS — F25.9 SCHIZOAFFECTIVE DISORDER, UNSPECIFIED: ICD-10-CM

## 2023-04-25 ENCOUNTER — APPOINTMENT (OUTPATIENT)
Dept: PSYCHIATRY | Facility: CLINIC | Age: 28
End: 2023-04-25

## 2023-04-26 ENCOUNTER — APPOINTMENT (OUTPATIENT)
Dept: PSYCHIATRY | Facility: CLINIC | Age: 28
End: 2023-04-26

## 2023-05-01 NOTE — PLAN
[de-identified] : After multiple attempts at outreach in February 2023, Pt was sent a letter on 3/10/23 that stated the following:\par \par Dear Isabella,\philipp Hope this letter finds you well. We have not [facility] at the [facility]\par for a several weeks now. We have attempted to reach you via telephone and email to engage in\par treatment and also to determine your interest in continuing to receive services within our program.\par In order to keep your spot, we need to hear from you no later than March 24th, 2023. You can contact Dr.\par Purcell ([phone]; [email or web address]) or Dr. Simmons ([phone];[email or web address]). Even if you are not interested in continuing services with our program, it\par would be great for us to have at least one more meeting as an opportunity to say good-bye.\par In case we don’t hear from you by March 24th, 2023, we will provide you with resources for further\par treatment. If you have an emergency, you may always call [phone], go the nearest Emergency Room or call\par 911.\par Looking forward hearing from you! \par \par Pt did not respond to the above letter.

## 2023-05-01 NOTE — HISTORY OF PRESENT ILLNESS
[Suicidal Behavior/Ideation] : suicidal behavior/ideation [Not Applicable] : Not applicable [FreeTextEntry1] : From Intake\par History of Present Illness\par Santos is a 26 yo cisgender , heterosexual, White Orthodoxy female currently unemployed and living with her  in an apartment in [place], [place]. 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 Santos was referred to the [facility] program for an evaluation from the STEP program at [facility] where she had been receiving services since June 2021 following first being evaluated at [facility] and noted to have converted to psychosis. Santos has one previous inpatient hospitalization June-July 2021 for approximately two weeks at [facility] (that preceded her admission to STEP program) due worsening psychotic symptoms. \par Psychosis: Clt reports beginning to experience AH's starting in 2015 (start of college) prodromal period indicating an ability to reality test experiences during this time. Clt reports by summer 2020 (graduating college) an increase in AH's in intensity and content i.e. daily and constant with the inclusion of CAH telling clt to kill herself/self harm, with clt believing she was hearing the thoughts of others/no longer able to reality test these experiences. Clt reports experiencing thought broadcasting, insertion and control during this time. Paranoid ideation towards others i.e. parents,  (fiance at the time), disorganized thought process and depersonalization/derealization and feeling that she was being "tested" that "everyone else was in on" and felt there would be "consequences" if she failed. Endorsed receiving messages from clothing, books, social media, etc. that clt felt were referencing her/her life. Clt AH/CAH's and paranoia worsened to the extent where clt felt the thoughts of others were "attacking me" and "going to destroy me" seeking out services by way of inpatient hospitalization at [facility] where she was voluntarily inpatient for approximately two weeks between June-July 2021.\par Depression: Clt reports a history of depression while in high school (approx. 16yo) and notes experiencing social anxiety around others/in social settings. Clt reports challenges with body image and notes some disordered eating during this time. Clt reports her depression worsened/peaked during senior year (20yo) while applying to colleges and appeared to experience a depressive episode of excessive sleeping, challenges completing homework assignments/tasks and some decline in academic functioning. Santos reports engaging in mental health services her freshman year of college first starting off campus seeing providers and then on campus with a therapist and psychiatrist when she started psychotropic medication for depression.\par No history of nolan/manic episodes reported or noted\par Santos has a PMHx of Hodgkin's lymphoma summer 2017 and is currently in remission.  [FreeTextEntry2] : Past Psychiatric History: PAST OUTPATIENT TREATMENT (PSYCHOTHERAPY, PSYCHOPHARMACOLOGY, PSYCHOLOGICAL TESTING):\par [facility] [id number] (freshman year) for therapy and medication management; DBT IOP Summer 2019; DBT outpatient therapist Adolfo Randolph fall 2019- spring 2021; psychiatrist Dr. Crowell for medication mgmt fall 2019\par PAST PSYCHIATRIC MEDICATIONS (NAMES, DOSES, DATES TAKEN, EFFECTIVENESS):\par Sertraline from [id number]\par Abilify, Busbar, Gabapentin until Summer of 2020\par Seroquel ER 400mg PO BID, Lamictal 200mg PO Qam, Propranolol 5mg PO TID PRN anxiety (current medications)\par PAST HOSPITALIZATIONS (DATES, REASON FOR ADMISSION, LENGTH OF STAY, TREATMENT, LAST PSYCHIATRIC TREATMENT):\par [facility] voluntary admission due to paranoid ideation, CAH to harm self June-July 2021 for 2.5 weeks

## 2023-05-01 NOTE — TREATMENT
[FreeTextEntry8] : See detailed notes from Dr. Alexsandra Purcell - medications prescribed at last visit 1/24/23 were: 1. busPIRone HCl - 5 MG Oral Tablet; TAKE 1 TABLET Every 8 hours\par  2. lamoTRIgine 200 MG Oral Tablet; Take 1 tablet daily\par  3. LORazepam 0.5 MG Oral Tablet; Take 1 tablet daily MDD:0.5mg\par  4. QUEtiapine Fumarate  MG Oral Tablet Extended Release 24 Hour; Take 1 tablet\par  twice daily\par  5. QUEtiapine Fumarate  MG Oral Tablet Extended Release 24 Hour; Take 1 tablet\par  twice daily\par  6. QUEtiapine Fumarate ER 50 MG Oral Tablet Extended Release 24 Hour; Take 1 tablet\par  twice daily\par  [de-identified] : From September 29th through December 20th patient was engaged in psychotherapy with [facility] [place] [facility] with Dr. Boby Simmons. Interventions centered around alliance building, as well as supportive interventions around differentiation between thoughts and feelings versus facts. Pt was also engaged with Nabila Carbajal, [facility] Nurse, as well as attended the [facility] Social Skills group with Jolynn Landers MA, during her time in the [facility].  [de-identified] : In December 2022, pt reported that she felt her primary therapist as well as her psychiatrist were not understanding her well enough. Dr. Alexsandra Purcell, in a note from 1/24/23 , reported a conversation with pt's   and mother. According to pt's  and mother,  pt did not wish to work with PC and feels triggered by encounters with psychiatrist. PT feels she is struggling with a dissociative disorder and may have been misdiagnosed. She reported seeking alternative evaluations and pt and family wondering iif [facility] is the best fit.  shares that pt did not have discrete childhood traumas but has always been highly sensitive, imaginative, and may have internalized many experiences as traumatic. From a young age she felt that other people knew what was going on in her head and did not respond to her needs.  [de-identified] : Pt made initial progress towards goals of feeling comfortable with [facility] [place] [facility], including meeting in the community with primary clinician Dr. Boby Simmons, Psychiatrist Dr. Alexsandra Purcell, Freeman Carbajal, and attending Social Skills Group with Jolynn Landers MA. However, as stated above, pt eventually reported that she was not being understood well enough by her clinicians and discontinued treatment in favor of seeking services outside of [facility].

## 2023-05-01 NOTE — REASON FOR VISIT
[Patient seeking care elsewhere] : Patient seeking care elsewhere [FreeTextEntry5] : E [FreeTextEntry9] : 8/29/23 [FreeTextEntry8] : 5/1/23 [FreeTextEntry2] : [facility] [place] [facility]  [facility]

## 2023-05-02 ENCOUNTER — APPOINTMENT (OUTPATIENT)
Dept: PSYCHIATRY | Facility: CLINIC | Age: 28
End: 2023-05-02

## 2023-05-09 ENCOUNTER — APPOINTMENT (OUTPATIENT)
Dept: PSYCHIATRY | Facility: CLINIC | Age: 28
End: 2023-05-09

## 2023-05-16 ENCOUNTER — APPOINTMENT (OUTPATIENT)
Dept: PSYCHIATRY | Facility: CLINIC | Age: 28
End: 2023-05-16

## 2023-05-23 ENCOUNTER — APPOINTMENT (OUTPATIENT)
Dept: PSYCHIATRY | Facility: CLINIC | Age: 28
End: 2023-05-23

## 2023-05-30 ENCOUNTER — APPOINTMENT (OUTPATIENT)
Dept: PSYCHIATRY | Facility: CLINIC | Age: 28
End: 2023-05-30

## 2023-06-06 ENCOUNTER — APPOINTMENT (OUTPATIENT)
Dept: PSYCHIATRY | Facility: CLINIC | Age: 28
End: 2023-06-06

## 2023-06-13 ENCOUNTER — APPOINTMENT (OUTPATIENT)
Dept: PSYCHIATRY | Facility: CLINIC | Age: 28
End: 2023-06-13

## 2023-06-20 ENCOUNTER — APPOINTMENT (OUTPATIENT)
Dept: PSYCHIATRY | Facility: CLINIC | Age: 28
End: 2023-06-20

## 2023-06-27 ENCOUNTER — APPOINTMENT (OUTPATIENT)
Dept: PSYCHIATRY | Facility: CLINIC | Age: 28
End: 2023-06-27